# Patient Record
Sex: MALE | Race: WHITE | Employment: FULL TIME | URBAN - METROPOLITAN AREA
[De-identification: names, ages, dates, MRNs, and addresses within clinical notes are randomized per-mention and may not be internally consistent; named-entity substitution may affect disease eponyms.]

---

## 2017-02-28 ENCOUNTER — ALLSCRIPTS OFFICE VISIT (OUTPATIENT)
Dept: OTHER | Facility: OTHER | Age: 62
End: 2017-02-28

## 2017-02-28 ENCOUNTER — GENERIC CONVERSION - ENCOUNTER (OUTPATIENT)
Dept: OTHER | Facility: OTHER | Age: 62
End: 2017-02-28

## 2017-02-28 DIAGNOSIS — Z86.73 PERSONAL HISTORY OF TRANSIENT ISCHEMIC ATTACK (TIA), AND CEREBRAL INFARCTION WITHOUT RESIDUAL DEFICITS: ICD-10-CM

## 2017-02-28 DIAGNOSIS — E78.5 HYPERLIPIDEMIA: ICD-10-CM

## 2017-04-13 ENCOUNTER — APPOINTMENT (OUTPATIENT)
Dept: LAB | Age: 62
End: 2017-04-13
Payer: COMMERCIAL

## 2017-04-13 ENCOUNTER — TRANSCRIBE ORDERS (OUTPATIENT)
Dept: LAB | Age: 62
End: 2017-04-13

## 2017-04-13 DIAGNOSIS — Z86.73 PERSONAL HISTORY OF TRANSIENT ISCHEMIC ATTACK (TIA), AND CEREBRAL INFARCTION WITHOUT RESIDUAL DEFICITS: ICD-10-CM

## 2017-04-13 DIAGNOSIS — E78.5 HYPERLIPIDEMIA: ICD-10-CM

## 2017-04-13 LAB
ALBUMIN SERPL BCP-MCNC: 3.9 G/DL (ref 3.5–5)
ALP SERPL-CCNC: 105 U/L (ref 46–116)
ALT SERPL W P-5'-P-CCNC: 62 U/L (ref 12–78)
ANION GAP SERPL CALCULATED.3IONS-SCNC: 4 MMOL/L (ref 4–13)
AST SERPL W P-5'-P-CCNC: 33 U/L (ref 5–45)
BILIRUB SERPL-MCNC: 0.69 MG/DL (ref 0.2–1)
BUN SERPL-MCNC: 16 MG/DL (ref 5–25)
CALCIUM SERPL-MCNC: 9.2 MG/DL (ref 8.3–10.1)
CHLORIDE SERPL-SCNC: 103 MMOL/L (ref 100–108)
CHOLEST SERPL-MCNC: 130 MG/DL (ref 50–200)
CO2 SERPL-SCNC: 31 MMOL/L (ref 21–32)
CREAT SERPL-MCNC: 1 MG/DL (ref 0.6–1.3)
GFR SERPL CREATININE-BSD FRML MDRD: >60 ML/MIN/1.73SQ M
GLUCOSE P FAST SERPL-MCNC: 90 MG/DL (ref 65–99)
HDLC SERPL-MCNC: 66 MG/DL (ref 40–60)
LDLC SERPL CALC-MCNC: 58 MG/DL (ref 0–100)
POTASSIUM SERPL-SCNC: 4.4 MMOL/L (ref 3.5–5.3)
PROT SERPL-MCNC: 7.7 G/DL (ref 6.4–8.2)
SODIUM SERPL-SCNC: 138 MMOL/L (ref 136–145)
T4 FREE SERPL-MCNC: 0.79 NG/DL (ref 0.76–1.46)
TRIGL SERPL-MCNC: 32 MG/DL
TSH SERPL DL<=0.05 MIU/L-ACNC: 2.57 UIU/ML (ref 0.36–3.74)

## 2017-04-13 PROCEDURE — 80061 LIPID PANEL: CPT

## 2017-04-13 PROCEDURE — 84443 ASSAY THYROID STIM HORMONE: CPT

## 2017-04-13 PROCEDURE — 84439 ASSAY OF FREE THYROXINE: CPT

## 2017-04-13 PROCEDURE — 36415 COLL VENOUS BLD VENIPUNCTURE: CPT

## 2017-04-13 PROCEDURE — 80053 COMPREHEN METABOLIC PANEL: CPT

## 2017-04-25 ENCOUNTER — ALLSCRIPTS OFFICE VISIT (OUTPATIENT)
Dept: OTHER | Facility: OTHER | Age: 62
End: 2017-04-25

## 2017-07-18 ENCOUNTER — GENERIC CONVERSION - ENCOUNTER (OUTPATIENT)
Dept: OTHER | Facility: OTHER | Age: 62
End: 2017-07-18

## 2017-09-27 ENCOUNTER — ALLSCRIPTS OFFICE VISIT (OUTPATIENT)
Dept: OTHER | Facility: OTHER | Age: 62
End: 2017-09-27

## 2017-09-27 DIAGNOSIS — E78.5 HYPERLIPIDEMIA: ICD-10-CM

## 2017-09-27 DIAGNOSIS — Z00.00 ENCOUNTER FOR GENERAL ADULT MEDICAL EXAMINATION WITHOUT ABNORMAL FINDINGS: ICD-10-CM

## 2017-09-27 DIAGNOSIS — R53.83 OTHER FATIGUE: ICD-10-CM

## 2017-09-27 LAB
BILIRUB UR QL STRIP: NORMAL
CLARITY UR: NORMAL
COLOR UR: CLEAR
GLUCOSE (HISTORICAL): NORMAL
HGB UR QL STRIP.AUTO: NORMAL
KETONES UR STRIP-MCNC: NORMAL MG/DL
LEUKOCYTE ESTERASE UR QL STRIP: NORMAL
NITRITE UR QL STRIP: NORMAL
PH UR STRIP.AUTO: 5 [PH]
PROT UR STRIP-MCNC: NORMAL MG/DL
SP GR UR STRIP.AUTO: 1
UROBILINOGEN UR QL STRIP.AUTO: NORMAL

## 2017-10-10 ENCOUNTER — GENERIC CONVERSION - ENCOUNTER (OUTPATIENT)
Dept: OTHER | Facility: OTHER | Age: 62
End: 2017-10-10

## 2017-10-17 ENCOUNTER — GENERIC CONVERSION - ENCOUNTER (OUTPATIENT)
Dept: OTHER | Facility: OTHER | Age: 62
End: 2017-10-17

## 2017-11-01 ENCOUNTER — GENERIC CONVERSION - ENCOUNTER (OUTPATIENT)
Dept: OTHER | Facility: OTHER | Age: 62
End: 2017-11-01

## 2017-12-28 ENCOUNTER — TRANSCRIBE ORDERS (OUTPATIENT)
Dept: LAB | Age: 62
End: 2017-12-28

## 2017-12-28 ENCOUNTER — APPOINTMENT (OUTPATIENT)
Dept: LAB | Age: 62
End: 2017-12-28
Payer: COMMERCIAL

## 2017-12-28 DIAGNOSIS — E78.5 HYPERLIPIDEMIA: ICD-10-CM

## 2017-12-28 DIAGNOSIS — R53.83 OTHER FATIGUE: ICD-10-CM

## 2017-12-28 DIAGNOSIS — Z00.00 ENCOUNTER FOR GENERAL ADULT MEDICAL EXAMINATION WITHOUT ABNORMAL FINDINGS: ICD-10-CM

## 2017-12-28 LAB
ALBUMIN SERPL BCP-MCNC: 4.1 G/DL (ref 3.5–5)
ALP SERPL-CCNC: 71 U/L (ref 46–116)
ALT SERPL W P-5'-P-CCNC: 40 U/L (ref 12–78)
ANION GAP SERPL CALCULATED.3IONS-SCNC: 6 MMOL/L (ref 4–13)
AST SERPL W P-5'-P-CCNC: 25 U/L (ref 5–45)
BASOPHILS # BLD AUTO: 0.02 THOUSANDS/ΜL (ref 0–0.1)
BASOPHILS NFR BLD AUTO: 0 % (ref 0–1)
BILIRUB SERPL-MCNC: 0.87 MG/DL (ref 0.2–1)
BUN SERPL-MCNC: 14 MG/DL (ref 5–25)
CALCIUM SERPL-MCNC: 9 MG/DL (ref 8.3–10.1)
CHLORIDE SERPL-SCNC: 106 MMOL/L (ref 100–108)
CHOLEST SERPL-MCNC: 177 MG/DL (ref 50–200)
CO2 SERPL-SCNC: 29 MMOL/L (ref 21–32)
CREAT SERPL-MCNC: 0.98 MG/DL (ref 0.6–1.3)
EOSINOPHIL # BLD AUTO: 0.23 THOUSAND/ΜL (ref 0–0.61)
EOSINOPHIL NFR BLD AUTO: 4 % (ref 0–6)
ERYTHROCYTE [DISTWIDTH] IN BLOOD BY AUTOMATED COUNT: 13.7 % (ref 11.6–15.1)
GFR SERPL CREATININE-BSD FRML MDRD: 81 ML/MIN/1.73SQ M
GLUCOSE P FAST SERPL-MCNC: 93 MG/DL (ref 65–99)
HCT VFR BLD AUTO: 47 % (ref 36.5–49.3)
HDLC SERPL-MCNC: 75 MG/DL (ref 40–60)
HGB BLD-MCNC: 16.3 G/DL (ref 12–17)
LDLC SERPL CALC-MCNC: 90 MG/DL (ref 0–100)
LYMPHOCYTES # BLD AUTO: 2.38 THOUSANDS/ΜL (ref 0.6–4.47)
LYMPHOCYTES NFR BLD AUTO: 36 % (ref 14–44)
MCH RBC QN AUTO: 30.6 PG (ref 26.8–34.3)
MCHC RBC AUTO-ENTMCNC: 34.7 G/DL (ref 31.4–37.4)
MCV RBC AUTO: 88 FL (ref 82–98)
MONOCYTES # BLD AUTO: 0.48 THOUSAND/ΜL (ref 0.17–1.22)
MONOCYTES NFR BLD AUTO: 7 % (ref 4–12)
NEUTROPHILS # BLD AUTO: 3.41 THOUSANDS/ΜL (ref 1.85–7.62)
NEUTS SEG NFR BLD AUTO: 53 % (ref 43–75)
NRBC BLD AUTO-RTO: 0 /100 WBCS
PLATELET # BLD AUTO: 254 THOUSANDS/UL (ref 149–390)
PMV BLD AUTO: 10.5 FL (ref 8.9–12.7)
POTASSIUM SERPL-SCNC: 4.5 MMOL/L (ref 3.5–5.3)
PROT SERPL-MCNC: 8.1 G/DL (ref 6.4–8.2)
PSA SERPL-MCNC: 1.1 NG/ML (ref 0–4)
RBC # BLD AUTO: 5.32 MILLION/UL (ref 3.88–5.62)
SODIUM SERPL-SCNC: 141 MMOL/L (ref 136–145)
TRIGL SERPL-MCNC: 59 MG/DL
WBC # BLD AUTO: 6.53 THOUSAND/UL (ref 4.31–10.16)

## 2017-12-28 PROCEDURE — 85025 COMPLETE CBC W/AUTO DIFF WBC: CPT

## 2017-12-28 PROCEDURE — 80061 LIPID PANEL: CPT

## 2017-12-28 PROCEDURE — 80053 COMPREHEN METABOLIC PANEL: CPT

## 2017-12-28 PROCEDURE — G0103 PSA SCREENING: HCPCS

## 2017-12-28 PROCEDURE — 36415 COLL VENOUS BLD VENIPUNCTURE: CPT

## 2018-01-11 NOTE — PROGRESS NOTES
Assessment   1  Encounter for preventive health examination (V70 0) (Z00 00)  2  Dyslipidemia (272 4) (E78 5)  3  Vitamin D deficiency (268 9) (E55 9)    Plan  Dyslipidemia    · (1) T4, FREE; Status:Active; Requested for:97Cca0500;    · (1) TSH; Status:Active; Requested for:24Plc4254; Health Maintenance    · (1) CBC/PLT/DIFF; Status:Active; Requested for:22Swc8180;    · (1) COMPREHENSIVE METABOLIC PANEL; Status:Active; Requested for:81Vic2243;    · (1) LIPID PANEL, FASTING; Status:Active; Requested for:56Bnl2601;    · (1) PSA (SCREEN) (Dx V76 44 Screen for Prostate Cancer); Status:Active; Requested  for:41Sjk1263;    · DIGITAL RECTAL EXAM; Status:Resulted - Requires Verification;   Done: 19QRF2885  12:00AM1    · Hemoccult Screening - POC; Status:Resulted - Requires Verification;   Done: 23LDQ9913  12:00AM1    · Prostate Exam; Status:Resulted - Requires Verification;   Done: 84KOY9693 12:00AM1   Tick bite    · (1) LYME ANTIBODY, WESTERN BLOT; Status:Active; Requested for:02Qwz9190;   Vitamin D deficiency    · (1) VITAMIN D 25-HYDROXY; Status:Active; Requested QFR:16ZSE8758;      1 Amended By: Ariana Dumont; Sep 21 2016 2:55 PM EST    Discussion/Summary  Impression: health maintenance visit  Prostate cancer screening: the risks and benefits of prostate cancer screening were discussed and prostate cancer screening is current  Testicular cancer screening: the risks and benefits of testicular cancer screening were discussed and testicular cancer screening is current  Colorectal cancer screening: the risks and benefits of colorectal cancer screening were discussed and colorectal cancer screening is current  The risks and benefits of immunizations were discussed and immunizations are up to date  Patient discussion:  discussed with the patient1  1        Received flu vaccine1        1 Amended By: Ariana Dumont; Sep 21 2016 2:55 PM EST    Chief Complaint  insurance well exam      History of Present Illness  HM, Adult Male: The patient is being seen for a health maintenance evaluation  Social History: Household members include spouse  He is   Work status: working full time  The patient has never smoked cigarettes  He reports frequent alcohol use  He has never used illicit drugs  General Health: The patient's health since the last visit is described as good  He has regular dental visits  He denies vision problems  He denies hearing loss  Lifestyle:  He consumes a diverse and healthy diet  He does not have any weight concerns  He exercises regularly  He does not use tobacco  He consumes alcohol  He denies drug use  Screening: Prostate cancer screening includes prostate-specific antigen testing last year  Testicular cancer screening includes no previous evaluation  Colorectal cancer screening includes a colonoscopy within the past ten years  Metabolic screening includes lipid profile performed within the past five years and glucose screening performed last year  Cardiovascular risk factors: no hypertension, no diabetes, no tobacco use, no illicit drug use and no sedentary lifestyle  Safety elements used: safe driving habits, smoke detector and carbon monoxide detector  Review of Systems    Constitutional: No fever or chills, feels well, no tiredness, no recent weight gain or weight loss  ENT: no complaints of earache, no hearing loss, no nosebleeds, no nasal discharge, no sore throat, no hoarseness  Cardiovascular: No complaints of slow heart rate, no fast heart rate, no chest pain, no palpitations, no leg claudication, no lower extremity  Respiratory: No complaints of shortness of breath, no wheezing, no cough, no SOB on exertion, no orthopnea or PND  Active Problems   1  Dyslipidemia (272 4) (E78 5)  2  Fatigue (780 79) (R53 83)  3   Lumbar pain (724 2) (M54 5)    Past Medical History    · History of Abdominal pain, lower (789 09) (R10 30)   · History of Acute bronchitis (466 0) (J20 9)   · History of Acute sinusitis (461 9) (J01 90)   · History of Bee Sting (E905 3)    Surgical History    · Denied: History Of Prior Surgery    Family History  Mother    · Family history of Mother  At Age 62  Father    · Family history of Father  At Age 80    Social History    · Alcohol Use (History)   · Alcohol Use (History)   · DAILY USE   · Never A Smoker    Current Meds  1  Fish Oil 1000 MG Oral Capsule; TAKE 1 CAPSULE DAILY; Therapy: 99KOL1902 to Recorded  2  Glucosamine Chondr 1500 Complx Oral Capsule; TAKE 1 CAPSULE DAILY; Therapy: 03IEP6520 to Recorded  3  Multivitamins Oral Capsule; TAKE 1 CAPSULE DAILY; Therapy: 40BGH2303 to (Evaluate:36Drr2514) Recorded  4  Red Yeast Rice Extract Powder; USE AS DIRECTED; Therapy: 99JRY4694 to Recorded  5  Vitamin D3 Super Strength 2000 UNIT Oral Tablet; TAKE 1 TABLET BY MOUTH DAILY   AT 8AM *BEG  (SUPPLEMENT); Therapy: 09ASI9563 to Recorded    Allergies   1  No Known Drug Allergies   2  Bee sting    Vitals   Recorded: 26OBG3751 68:12UD   Systolic 005   Diastolic 78   Heart Rate 68   Temperature 97 5 F, Tympanic   Height 5 ft 8 in   Weight 162 lb    BMI Calculated 24 63   BSA Calculated 1 87     Physical Exam    Constitutional   General appearance: No acute distress, well appearing and well nourished  Head and Face   Head and face: Normal     Palpation of the face and sinuses: No sinus tenderness  Eyes   Conjunctiva and lids: No erythema, swelling or discharge  Pupils and irises: Equal, round, reactive to light  Ears, Nose, Mouth, and Throat   External inspection of ears and nose: Normal     Otoscopic examination: Tympanic membranes translucent with normal light reflex  Canals patent without erythema  Hearing: Normal     Nasal mucosa, septum, and turbinates: Normal without edema or erythema  Lips, teeth, and gums: Normal, good dentition  Neck   Neck: Supple, symmetric, trachea midline, no masses      Thyroid: Normal, no thyromegaly  Pulmonary   Respiratory effort: No increased work of breathing or signs of respiratory distress  Cardiovascular   Auscultation of heart: Normal rate and rhythm, normal S1 and S2, no murmurs  Carotid pulses: 2+ bilaterally  Abdomen   Abdomen: Non-tender, no masses  -heme, -nodule  Liver and spleen: No hepatomegaly or splenomegaly  Examination for hernias: No hernias appreciated   -inguinal hernia        Procedure    Procedure:   Results: 20/20 in both eyes without corrective device, 20/20 in the right eye without corrective device, 20/25 in the left eye without corrective device      Signatures   Electronically signed by : Chelsy Calzada DO; Sep 21 2016  2:58PM EST                       (Author)

## 2018-01-12 NOTE — MISCELLANEOUS
Message  discussed to start him on lipitor   he will consider      Signatures   Electronically signed by : Hilario Hernandez DO; Oct 10 2016  8:03AM EST                       (Author)

## 2018-01-13 VITALS
HEART RATE: 61 BPM | DIASTOLIC BLOOD PRESSURE: 76 MMHG | SYSTOLIC BLOOD PRESSURE: 128 MMHG | RESPIRATION RATE: 16 BRPM | BODY MASS INDEX: 25.22 KG/M2 | WEIGHT: 166.38 LBS | OXYGEN SATURATION: 97 % | HEIGHT: 68 IN | TEMPERATURE: 97.6 F

## 2018-01-16 NOTE — MISCELLANEOUS
Assessment    1  History of TIA (transient ischemic attack) and stroke (V12 54) (Z86 73)   2  Dyslipidemia (272 4) (E78 5)    Plan  Dyslipidemia    · Atorvastatin Calcium 80 MG Oral Tablet (Lipitor); TAKE 1 TABLET DAILY   Rx By: Piper Lewis; Dispense: 30 Days ; #:30 Tablet; Refill: 5; For: Dyslipidemia; MARY JANE = N; Verified Transmission to [address] [place] [address] [id number]; Last Updated By: SystemSPO; 2/28/2017 3:26:40 PM   · (1) TSH; Status:Active; Requested for:28Feb2017;    Perform:[facility]; EQH:55VND8284; Ordered; For:Dyslipidemia; Ordered By:Roman Funez;  Dyslipidemia, History of TIA (transient ischemic attack) and stroke    · (1) COMPREHENSIVE METABOLIC PANEL; Status:Active; Requested for:28Feb2017;    Perform:[facility]; CCF:55XUN6169; Ordered; For:Dyslipidemia, History of TIA (transient ischemic attack) and stroke; Ordered By:Obdulia Funez;   · (1) LIPID PANEL, FASTING; Status:Active; Requested for:28Feb2017;    Perform:[facility]; ETS:38DZI5182; Ordered; For:Dyslipidemia, History of TIA (transient ischemic attack) and stroke; Ordered By:Roman Funez;  History of TIA (transient ischemic attack) and stroke    · (1) T4, FREE; Status:Active; Requested for:28Feb2017;    Perform:[facility]; XDK:63JJF4399; Ordered;  For:History of TIA (transient ischemic attack) and stroke; Ordered By:Obdulia Funez;    Discussion/Summary  Discussion Summary:   Told him check bp q 3 days and write down  his bp was elevated upon arival in hosp  ? situation ? otold him check lipids/lft in 6 weeks after starting statin  Chief Complaint  Chief Complaint Free Text Note Form: patient presented here for follow up from hospital   Chief [address]: Patient is here today for follow up of chronic conditions described in HPI        History of Present Illness  TCM Communication [facility]: The patient is being contacted for follow-up after hospitalization  Hospital records were not available  He was hospitalized [facility]  The date of admission: 02/19/2017, date of discharge: 02/20/2017  Diagnosis: TIA  He was discharged to home  Medications reviewed and updated today  He scheduled a follow up appointment  Follow-up appointments with other specialists: NONE  The patient is currently asymptomatic  Counseling was provided to the patient  Topics counseled included importance of compliance with treatment  Communication performed and completed by Jennie Leyden   HPI: pt states he had numbness/tingling left side of his body  denies durling or weakness  he went to hosp in ambulance  he was admitted and had neuro consult and told okay  had mri/mra  the studies were okay  we received them and they are in the chart  his tsh is up a little ? if of concern      Review of Systems  Complete-Male:   Constitutional: No fever or chills, feels well, no tiredness, no recent weight gain or weight loss  ENT: no complaints of earache, no hearing loss, no nosebleeds, no nasal discharge, no sore throat, no hoarseness  Cardiovascular: No complaints of slow heart rate, no fast heart rate, no chest pain, no palpitations, no leg claudication, no lower extremity  Respiratory: No complaints of shortness of breath, no wheezing, no cough, no SOB on exertion, no orthopnea or PND  Gastrointestinal: No complaints of abdominal pain, no constipation, no nausea or vomiting, no diarrhea or bloody stools  Genitourinary: No complaints of dysuria, no incontinence, no hesitancy, no nocturia, no genital lesion, no testicular pain  Integumentary: No complaints of skin rash or skin lesions, no itching, no skin wound, no dry skin  Active Problems    1  Dyslipidemia (272 4) (E78 5)   2  Vitamin D deficiency (268 9) (E55 9)    Past Medical History    1  History of Abdominal pain, lower (789 09) (R10 30)   2  History of Acute bronchitis (466 0) (J20 9)   3   History of Acute sinusitis (461 9) (J01 90)   4  History of Bee Sting (C927 7)    Surgical History    1  Denied: History Of Prior Surgery  Surgical History Reviewed: The surgical history was reviewed and updated today  Family History  Mother    1  Family history of Mother  At Age 62  Father    2  Family history of Father  At Age 80  Family History Reviewed: The family history was reviewed and updated today  Social History    · Alcohol Use (History)   · Alcohol Use (History)   · Never A Smoker  Social History Reviewed: The social history was reviewed and updated today  The social history was reviewed and is unchanged  Current Meds   1  Aspirin EC 81 MG Oral Tablet Delayed Release; TAKE 1 TABLET DAILY; Therapy: 92Bco1809 to Recorded   2  Fish Oil 1000 MG Oral Capsule; TAKE 1 CAPSULE DAILY; Therapy: 14CQO3306 to Recorded   3  Glucosamine Chondr 1500 Complx Oral Capsule; TAKE 1 CAPSULE DAILY; Therapy: 09PXM1017 to Recorded   4  Lipitor 80 MG Oral Tablet; TAKE 1 TABLET DAILY; Therapy: 73Uel9535 to Recorded   5  Multivitamins Oral Capsule; TAKE 1 CAPSULE DAILY; Therapy: 38XZI9328 to (Evaluate:2015) Recorded   6  Red Yeast Rice Extract Powder; USE AS DIRECTED; Therapy: 46ANL4831 to Recorded   7  Vitamin D3 Super Strength 2000 UNIT Oral Tablet; TAKE 1 TABLET BY MOUTH DAILY AT   8AM *BEG  (SUPPLEMENT); Therapy: 83GNQ0489 to Recorded  Medication List Reviewed: The medication list was reviewed and updated today  Allergies    1  No Known Drug Allergies    2   Bee sting    Vitals  Signs   Recorded: 2017 02:42PM   Temperature: 97 5 F, Tympanic  Heart Rate: 68  Pulse Quality: Normal  Respiration Quality: Normal  Respiration: 16  Systolic: 228, LUE, Sitting  Diastolic: 84, LUE, Sitting  Height: 5 ft 8 in  Weight: 163 lb 8 oz  BMI Calculated: 24 86  BSA Calculated: 1 88  O2 Saturation: 98    Physical Exam    Constitutional   General appearance: No acute distress, well appearing and well nourished  Eyes   Conjunctiva and lids: No swelling, erythema, or discharge  Pupils and irises: Equal, round and reactive to light  Ears, Nose, Mouth, and Throat   External inspection of ears and nose: Normal     Otoscopic examination: Tympanic membrance translucent with normal light reflex  Canals patent without erythema  Nasal mucosa, septum, and turbinates: Normal without edema or erythema  Oropharynx: Normal with no erythema, edema, exudate or lesions  Pulmonary   Respiratory effort: No increased work of breathing or signs of respiratory distress  Auscultation of lungs: Clear to auscultation, equal breath sounds bilaterally, no wheezes, no rales, no rhonci  Cardiovascular   Auscultation of heart: Normal rate and rhythm, normal S1 and S2, without murmurs  Examination of extremities for edema and/or varicosities: Normal     Carotid pulses: Normal     Abdomen   Abdomen: Non-tender, no masses  Liver and spleen: No hepatomegaly or splenomegaly  Lymphatic   Palpation of lymph nodes in neck: No lymphadenopathy  Results/Data  PHQ-2 Adult Depression Screening 84Yxt7455 02:43PM User, Sanchez     Test Name Result Flag Reference   PHQ-2 Adult Depression Score 0     Over the last two weeks, how often have you been bothered by any of the following problems?   Little interest or pleasure in doing things: Not at all - 0  Feeling down, depressed, or hopeless: Not at all - 0   PHQ-2 Adult Depression Screening Negative       (1) PSA (SCREEN) (Dx V76 44 Screen for Prostate Cancer) 53DYX7703 12:00AM      Test Name Result Flag Reference   PSA 1 09         Future Appointments    Date/Time Provider Specialty Site   04/27/2017 02:30 PM Rachael Agudelo DO Internal Medicine [address] [place] [address]   Electronically signed by : Tamara Carver DO; Feb 28 2017  9:13PM EST                       (Author)

## 2018-01-22 VITALS
SYSTOLIC BLOOD PRESSURE: 134 MMHG | DIASTOLIC BLOOD PRESSURE: 84 MMHG | TEMPERATURE: 97.5 F | OXYGEN SATURATION: 98 % | BODY MASS INDEX: 24.78 KG/M2 | WEIGHT: 163.5 LBS | RESPIRATION RATE: 16 BRPM | HEART RATE: 68 BPM | HEIGHT: 68 IN

## 2018-01-22 VITALS
WEIGHT: 163 LBS | HEART RATE: 66 BPM | DIASTOLIC BLOOD PRESSURE: 72 MMHG | TEMPERATURE: 96.7 F | RESPIRATION RATE: 16 BRPM | SYSTOLIC BLOOD PRESSURE: 120 MMHG | OXYGEN SATURATION: 97 % | BODY MASS INDEX: 24.71 KG/M2 | HEIGHT: 68 IN

## 2018-01-22 VITALS
HEIGHT: 68 IN | HEART RATE: 70 BPM | WEIGHT: 167.25 LBS | TEMPERATURE: 96.9 F | BODY MASS INDEX: 25.35 KG/M2 | SYSTOLIC BLOOD PRESSURE: 120 MMHG | RESPIRATION RATE: 16 BRPM | DIASTOLIC BLOOD PRESSURE: 78 MMHG | OXYGEN SATURATION: 98 %

## 2018-01-23 NOTE — PROGRESS NOTES
Assessment   1  Encounter for preventive health examination (V70 0) (Z00 00)  2  TIA (transient ischemic attack) (435 9) (G45 9)  3  Dyslipidemia (272 4) (E78 5)  4  1   5  Skin lesion of face (709 9) (L98 9)1      1 Amended By: Arnav Myers; Sep 28 2017 5:06 AM EST    Plan   Dyslipidemia    ·   Renew: Atorvastatin Calcium 40 MG Oral Tablet; TAKE 1 TABLET DAILY AS DIRECTED  1    · 3   3    · (1) COMPREHENSIVE METABOLIC PANEL; Status:Active; Requested QWM:[id number];    · (1) LIPID PANEL, FASTING; Status:Active; Requested DJM:[id number];   Fatigue    · (1) CBC/PLT/DIFF; Status:Active; Requested [id number]; Health Maintenance    · (1) PSA (SCREEN) (Dx V76 44 Screen for Prostate Cancer); Status:Active; Requested  WHN:[id number];    · DIGITAL RECTAL EXAM; Status:Resulted - Requires Verification;   Done: 35OEM9741  12:00AM4    · Hemoccult Diagnostic - POC; Status:Resulted - Requires Verification;   Done:  99JFE5060 12:00AM4    · Prostate Exam; Status:Resulted - Requires Verification;   Done: 26QUW2850 12:00AM4    · SCREEN AUDIOGRAM- POC; Status:Complete - Retrospective By Protocol  Authorization;   Done: 80LKM7315 01:39PM   · SNELLEN VISION- POC; Status:Complete - Retrospective By Protocol Authorization;    Done: 96XTP9257 01:41PM   · Urine Dip Non-Automated- POC; Status:Complete - Retrospective By Protocol  Authorization;   Done: 04WMN0148 01:30PM  Skin lesion of face    · 1,3   1,3    · 2,3   2,3     [facility]  *  Status: Hold For - Scheduling  Requested for: 75SKU8878  Ordered; For: Dyslipidemia; Ordered By: Arnav Myers  Performed:   Due: 37ALN3791  [facility]  *  Status: Hold For - Scheduling  Requested for: 45EXX2235  Ordered;    For: Skin lesion of face;  Ordered By: Arnav Myers  Performed:   Due: 19SNQ6303  [facility]  [facility]  *  Status: Hold For - Scheduling  Requested for: 78LOY7332  Ordered; For: Skin lesion of face;  Ordered By: José Luis Porter  Performed:   Due: 71OSW0207      1 Amended By: José Luis Porter; Sep 27 2017 2:25 PM EST   2 Amended By: José Luis Porter; Sep 27 2017 2:29 PM EST   3 Amended By: José Luis Porter; Sep 28 2017 4:56 AM EST   4 Amended By: José Luis Porter; Sep 28 2017 9:36 AM EST    Discussion/Summary  health maintenance visit Prostate cancer screening: the risks and benefits of prostate cancer screening were discussed  Testicular cancer screening: the risks and benefits of testicular cancer screening were discussed  Colorectal cancer screening: the risks and benefits of colorectal cancer screening were discussed  Patient discussion: discussed with the patient  The patient was counseled on Hepatitis C screening  Chief Complaint  patient presents for a physical       History of Present Illness  HM, Adult Male: The patient is being seen for a health maintenance evaluation  Social History: Household members include spouse  He is   Work status: working full time  The patient has never smoked cigarettes  He reports frequent alcohol use  He has never used illicit drugs  General Health: The patient's health since the last visit is described as good  He has regular dental visits  He denies vision problems  He denies hearing loss  Lifestyle:  He consumes a diverse and healthy diet  He does not have any weight concerns  He exercises regularly  He does not use tobacco  He consumes alcohol  He denies drug use  Screening: Prostate cancer screening includes prostate-specific antigen testing last year  Testicular cancer screening includes monthly self testicular examinations  Colorectal cancer screening includes a colonoscopy within the past ten years  Metabolic screening includes lipid profile performed within the past five years, glucose screening performed last year and thyroid function test performed last year       Cardiovascular risk factors: no obesity, no tobacco use, no illicit drug use and no sedentary lifestyle  General health risks: no asbestos exposure and no radiation exposure  Safety elements used: safe driving habits and smoke detector  HPI: denies further tia s/s1        1 Amended By: Philipp Maier; Sep 28 2017 4:59 AM EST    Review of Systems    Constitutional: No fever or chills, feels well, no tiredness, no recent weight gain or weight loss  Eyes: No complaints of eye pain, no red eyes, no discharge from eyes, no itchy eyes  ENT: no complaints of earache, no hearing loss, no nosebleeds, no nasal discharge, no sore throat, no hoarseness  Cardiovascular: No complaints of slow heart rate, no fast heart rate, no chest pain, no palpitations, no leg claudication, no lower extremity  Respiratory: No complaints of shortness of breath, no wheezing, no cough, no SOB on exertion, no orthopnea or PND  Active Problems   1  Dyslipidemia (272 4) (E78 5)  2  Screening PSA (prostate specific antigen) (V76 44) (Z12 5)  3   Vitamin D deficiency (268 9) (E55 9)    Past Medical History    · History of Abdominal pain, lower (789 09) (R10 30)   · History of Acute bronchitis (466 0) (J20 9)   · History of Acute sinusitis (461 9) (J01 90)   · History of Bee sting (989 5,E905 3) (T63 441A)   · History of Encounter for screening for malignant neoplasm of colon (V76 51) (Z12 11)   · Denied: History of depression   · History of fatigue (V13 89) (Z87 898)   · History of low back pain (V13 59) (Z87 39)   · Denied: History of substance abuse   · History of TIA (transient ischemic attack) and stroke (V12 54) (Z86 73)   · History of Tick bite (919 4,E906 4) (W57 XXXA)    Surgical History    · Denied: History Of Prior Surgery    Family History  Mother    · Family history of Mother  At Age 62  Father    · Family history of Father  At Age 80  Family History    · Denied: Family history of depression   · Denied: Family history of substance abuse    Social History    · Never A Smoker    Current Meds  1  Aspirin EC 81 MG Oral Tablet Delayed Release; TAKE 1 TABLET DAILY; Therapy: 21Feb2017 to Recorded  2  Atorvastatin Calcium 80 MG Oral Tablet; TAKE 1 TABLET DAILY; Therapy: 21Feb2017 to (Evaluate:31Uxx8544)  Requested for: 75BFF2631; Last   Rx:[id number] Ordered    Allergies   1  No Known Drug Allergies   2  Bee sting    Vitals   Recorded: 37WYX9909 01:22PM   Temperature 96 9 F, Tympanic   Heart Rate 70, R Brachial Artery   Pulse Quality Normal, R Brachial Artery   Respiration Quality Normal   Respiration 16   Systolic 601, LUE, Sitting   Diastolic 78, LUE, Sitting   Height 5 ft 8 in   Weight 167 lb 4 oz   BMI Calculated 25 43   BSA Calculated 1 89   O2 Saturation 98     Physical Exam    Constitutional   General appearance: No acute distress, well appearing and well nourished  Head and Face   Head and face: Normal     Palpation of the face and sinuses: No sinus tenderness  Eyes   Conjunctiva and lids: No erythema, swelling or discharge  Pupils and irises: Equal, round, reactive to light  Ears, Nose, Mouth, and Throat   External inspection of ears and nose: Normal     Otoscopic examination: Tympanic membranes translucent with normal light reflex  Canals patent without erythema  Hearing: Normal     Nasal mucosa, septum, and turbinates: Normal without edema or erythema  Lips, teeth, and gums: Normal, good dentition  Oropharynx: Normal with no erythema, edema, exudate or lesions  Neck   Neck: Supple, symmetric, trachea midline, no masses  Thyroid: Normal, no thyromegaly  Pulmonary   Respiratory effort: No increased work of breathing or signs of respiratory distress  Auscultation of lungs: Clear to auscultation  1    Cardiovascular1    Auscultation of heart: Normal rate and rhythm, normal S1 and S2, no murmurs  1    Abdomen   Anus, perineum, and rectum: Normal sphincter tone, no masses, no prolapse   - NODULE -HEME  Stool sample for occult blood: Negative  1 Amended By: Kyle Valenzuela; Sep 28 2017 5:06 AM EST    Results/Data  SNELLEN VISION- POC [id number] 01:41PM Kyle Valenzuela     Test Name Result Flag Reference   Right Eye 40     Left Eye 40     Bilateral Eyes 25       SCREEN AUDIOGRAM- POC 29KCH5133 01:39PM Kyle Valenzuela     Test Name Result Flag Reference   Screening Audiogram      Right=Normal; Left=500-25, 1000-0, 2000-20, 4000-40     Urine Dip Non-Automated- POC 80TRV6220 01:30PM Kyle Valenzuela     Test Name Result Flag Reference   Color Clear     Clarity Transparent     Leukocytes NEG     Nitrite NEG     Blood NEG     Bilirubin NEG     Urobilinogen NEG     Protein NEG     Ph 5     Specific Magazine 1 005     Ketone NEG     Glucose NEG       PHQ-9 Adult Depression Screening 84HUR4604 01:27PM User, Sanchez     Test Name Result Flag Reference   PHQ-9 Adult Depression Score 0     Over the last two weeks, how often have you been bothered by any of the following problems? Little interest or pleasure in doing things: Not at all - 0  Feeling down, depressed, or hopeless: Not at all - 0  Trouble falling or staying asleep, or sleeping too much: Not at all - 0  Feeling tired or having little energy: Not at all - 0  Poor appetite or over eating: Not at all - 0  Feeling bad about yourself - or that you are a failure or have let yourself or your family down: Not at all - 0  Trouble concentrating on things, such as reading the newspaper or watching television: Not at all - 0  Moving or speaking so slowly that other people could have noticed  Or the opposite -  being so fidgety or restless that you have been moving around a lot more than usual: Not at all - 0  Thoughts that you would be better off dead, or of hurting yourself in some way: Not at all - 0   PHQ-9 Adult Depression Screening Negative     PHQ-9 Difficulty Level Not difficult at all     PHQ-9 Severity No Depression         Procedure    Procedure: Visual Acuity Test    Indication: routine screening     Results: 20/25 in both eyes without corrective device, 20/40 in the right eye without corrective device, 20/40 in the left eye without corrective device   Color vision was screened with the Ishihara Test and the results were normal      Procedure: Audiometry:   Hearing in the right ear: 40 decibals at 500 hertz, 20 decibals at 1000 hertz, 20 decibals at 2000 hertz and 25 decibals at 4000 hertz  Hearing in the left ear: 25 decibals at 500 hertz, 0 decibals at 1000 hertz, 20 decibals at 2000 hertz and 40 decibals at 4000 hertz         Signatures   Electronically signed by : Dyllan Trevizo DO; Sep 28 2017  9:37AM EST                       (Author)

## 2018-02-21 ENCOUNTER — LAB (OUTPATIENT)
Dept: LAB | Age: 63
End: 2018-02-21
Payer: COMMERCIAL

## 2018-02-21 DIAGNOSIS — E78.5 HYPERLIPIDEMIA: ICD-10-CM

## 2018-02-21 LAB
ALBUMIN SERPL BCP-MCNC: 3.9 G/DL (ref 3.5–5)
ALP SERPL-CCNC: 71 U/L (ref 46–116)
ALT SERPL W P-5'-P-CCNC: 35 U/L (ref 12–78)
ANION GAP SERPL CALCULATED.3IONS-SCNC: 9 MMOL/L (ref 4–13)
AST SERPL W P-5'-P-CCNC: 18 U/L (ref 5–45)
BILIRUB SERPL-MCNC: 0.69 MG/DL (ref 0.2–1)
BUN SERPL-MCNC: 14 MG/DL (ref 5–25)
CALCIUM SERPL-MCNC: 9 MG/DL (ref 8.3–10.1)
CHLORIDE SERPL-SCNC: 106 MMOL/L (ref 100–108)
CHOLEST SERPL-MCNC: 147 MG/DL (ref 50–200)
CO2 SERPL-SCNC: 26 MMOL/L (ref 21–32)
CREAT SERPL-MCNC: 0.94 MG/DL (ref 0.6–1.3)
GFR SERPL CREATININE-BSD FRML MDRD: 85 ML/MIN/1.73SQ M
GLUCOSE P FAST SERPL-MCNC: 92 MG/DL (ref 65–99)
HDLC SERPL-MCNC: 68 MG/DL (ref 40–60)
LDLC SERPL CALC-MCNC: 67 MG/DL (ref 0–100)
POTASSIUM SERPL-SCNC: 4.5 MMOL/L (ref 3.5–5.3)
PROT SERPL-MCNC: 7.8 G/DL (ref 6.4–8.2)
SODIUM SERPL-SCNC: 141 MMOL/L (ref 136–145)
TRIGL SERPL-MCNC: 60 MG/DL

## 2018-02-21 PROCEDURE — 80053 COMPREHEN METABOLIC PANEL: CPT

## 2018-02-21 PROCEDURE — 80061 LIPID PANEL: CPT

## 2018-02-21 PROCEDURE — 36415 COLL VENOUS BLD VENIPUNCTURE: CPT

## 2018-04-30 ENCOUNTER — TELEPHONE (OUTPATIENT)
Dept: FAMILY MEDICINE CLINIC | Facility: CLINIC | Age: 63
End: 2018-04-30

## 2018-04-30 NOTE — TELEPHONE ENCOUNTER
PATIENT CALLED HE WILL NEED A PRIOR AUTH FOR ATORVASTATIN 40 MG BECAUSE HE TAKES 1 1/2 TABLETS DAILY  CALL [phone]

## 2018-05-02 NOTE — TELEPHONE ENCOUNTER
Spk with patient  New insurance  Called to preauth Atorvastatin 1 1/2 tabs per day  They will fax a result after review

## 2018-05-08 NOTE — TELEPHONE ENCOUNTER
Spoke with insurance and they approved Atorvastatin 40mg 1 1/2 every day  [facility] with this information as well

## 2018-07-17 ENCOUNTER — TELEPHONE (OUTPATIENT)
Dept: FAMILY MEDICINE CLINIC | Facility: CLINIC | Age: 63
End: 2018-07-17

## 2018-07-17 DIAGNOSIS — E78.5 DYSLIPIDEMIA: Primary | ICD-10-CM

## 2018-07-17 RX ORDER — ATORVASTATIN CALCIUM 40 MG/1
TABLET, FILM COATED ORAL
Qty: 135 TABLET | Refills: 0 | Status: SHIPPED | OUTPATIENT
Start: 2018-07-17 | End: 2018-11-11 | Stop reason: SDUPTHER

## 2018-07-17 RX ORDER — ATORVASTATIN CALCIUM 40 MG/1
TABLET, FILM COATED ORAL
Qty: 135 TABLET | Refills: 1 | Status: SHIPPED | OUTPATIENT
Start: 2018-07-17 | End: 2018-07-17 | Stop reason: SDUPTHER

## 2018-07-17 RX ORDER — ATORVASTATIN CALCIUM 40 MG/1
TABLET, FILM COATED ORAL
COMMUNITY
Start: 2017-02-21 | End: 2018-11-14

## 2018-11-11 DIAGNOSIS — E78.5 DYSLIPIDEMIA: ICD-10-CM

## 2018-11-11 RX ORDER — ATORVASTATIN CALCIUM 40 MG/1
60 TABLET, FILM COATED ORAL DAILY
Qty: 135 TABLET | Refills: 1 | Status: SHIPPED | OUTPATIENT
Start: 2018-11-11 | End: 2019-02-26 | Stop reason: SDUPTHER

## 2018-11-13 RX ORDER — ALBUTEROL SULFATE 90 UG/1
2 AEROSOL, METERED RESPIRATORY (INHALATION) EVERY 4 HOURS PRN
COMMUNITY
Start: 2017-10-10 | End: 2018-11-14

## 2018-11-14 ENCOUNTER — OFFICE VISIT (OUTPATIENT)
Dept: FAMILY MEDICINE CLINIC | Facility: CLINIC | Age: 63
End: 2018-11-14
Payer: COMMERCIAL

## 2018-11-14 VITALS
RESPIRATION RATE: 16 BRPM | OXYGEN SATURATION: 98 % | SYSTOLIC BLOOD PRESSURE: 116 MMHG | TEMPERATURE: 97 F | DIASTOLIC BLOOD PRESSURE: 70 MMHG | WEIGHT: 164.4 LBS | BODY MASS INDEX: 24.92 KG/M2 | HEART RATE: 66 BPM | HEIGHT: 68 IN

## 2018-11-14 DIAGNOSIS — E78.5 DYSLIPIDEMIA: ICD-10-CM

## 2018-11-14 DIAGNOSIS — G45.9 TIA (TRANSIENT ISCHEMIC ATTACK): ICD-10-CM

## 2018-11-14 DIAGNOSIS — M19.90 OSTEOARTHRITIS, UNSPECIFIED OSTEOARTHRITIS TYPE, UNSPECIFIED SITE: ICD-10-CM

## 2018-11-14 DIAGNOSIS — R53.83 FATIGUE, UNSPECIFIED TYPE: ICD-10-CM

## 2018-11-14 DIAGNOSIS — Z00.00 PHYSICAL EXAM: Primary | ICD-10-CM

## 2018-11-14 DIAGNOSIS — Z12.5 SCREENING FOR PROSTATE CANCER: ICD-10-CM

## 2018-11-14 PROCEDURE — 99173 VISUAL ACUITY SCREEN: CPT | Performed by: INTERNAL MEDICINE

## 2018-11-14 PROCEDURE — 92551 PURE TONE HEARING TEST AIR: CPT | Performed by: INTERNAL MEDICINE

## 2018-11-14 PROCEDURE — 99397 PER PM REEVAL EST PAT 65+ YR: CPT | Performed by: INTERNAL MEDICINE

## 2018-11-14 RX ORDER — OMEGA-3 FATTY ACIDS CAP DELAYED RELEASE 1000 MG 1000 MG
CAPSULE DELAYED RELEASE ORAL 2 TIMES DAILY
Refills: 0
Start: 2018-11-14

## 2018-11-14 RX ORDER — LANOLIN ALCOHOL/MO/W.PET/CERES
1 CREAM (GRAM) TOPICAL 3 TIMES DAILY
Refills: 0
Start: 2018-11-14

## 2018-11-14 NOTE — PROGRESS NOTES
Assessment/Plan:         Diagnoses and all orders for this visit:    Physical exam    TIA (transient ischemic attack)  -     Omega-3 Fatty Acids (FISH OIL) 1000 MG CPDR; Take by mouth 2 (two) times a day    Osteoarthritis, unspecified osteoarthritis type, unspecified site  -     glucosamine-chondroitin 500-400 MG tablet; Take 1 tablet by mouth 3 (three) times a day    Dyslipidemia  -     Comprehensive metabolic panel; Future  -     Lipid panel; Future    Screening for prostate cancer  -     PSA, total and free; Future    Fatigue, unspecified type  -     CBC; Future    Other orders  -     ASPIRIN 81 PO; Take 1 tablet by mouth daily  -     Discontinue: albuterol (PROAIR HFA) 90 mcg/act inhaler; Inhale 2 puffs every 4 (four) hours as needed          Subjective:      Patient ID: Vega Mixon is a 70 y o  male  Pt in for annual well        The following portions of the patient's history were reviewed and updated as appropriate:   He  has a past medical history of H/O TIA (transient ischemic attack) and stroke and Skin lesion of face  He There are no active problems to display for this patient  He  has a past surgical history that includes No past surgeries  His family history is not on file  He  reports that he has never smoked  He does not have any smokeless tobacco history on file  He reports that he drinks alcohol  His drug history is not on file  Current Outpatient Prescriptions   Medication Sig Dispense Refill    ASPIRIN 81 PO Take 1 tablet by mouth daily      atorvastatin (LIPITOR) 40 mg tablet Take 1 5 tablets (60 mg total) by mouth daily 135 tablet 1    glucosamine-chondroitin 500-400 MG tablet Take 1 tablet by mouth 3 (three) times a day  0    Omega-3 Fatty Acids (FISH OIL) 1000 MG CPDR Take by mouth 2 (two) times a day  0     No current facility-administered medications for this visit        Current Outpatient Prescriptions on File Prior to Visit   Medication Sig    atorvastatin (LIPITOR) 40 mg tablet Take 1 5 tablets (60 mg total) by mouth daily     No current facility-administered medications on file prior to visit  He is allergic to bee venom       Review of Systems   Constitutional: Negative  HENT: Negative  Respiratory: Negative  Cardiovascular: Negative  Neurological: Negative for syncope and facial asymmetry  Objective:      /70 (BP Location: Right arm, Patient Position: Sitting, Cuff Size: Standard)   Pulse 66   Temp (!) 97 °F (36 1 °C)   Resp 16   Ht 5' 8" (1 727 m)   Wt 74 6 kg (164 lb 6 4 oz)   SpO2 98%   BMI 25 00 kg/m²          Physical Exam   Constitutional: He appears well-developed and well-nourished  HENT:   Head: Normocephalic and atraumatic  Right Ear: External ear normal    Left Ear: External ear normal    Neck: Normal range of motion  Neck supple  Cardiovascular: Normal rate, regular rhythm and normal heart sounds      Pulmonary/Chest: Effort normal and breath sounds normal

## 2019-02-06 ENCOUNTER — APPOINTMENT (OUTPATIENT)
Dept: LAB | Age: 64
End: 2019-02-06
Payer: COMMERCIAL

## 2019-02-06 DIAGNOSIS — E78.5 DYSLIPIDEMIA: ICD-10-CM

## 2019-02-06 DIAGNOSIS — Z12.5 SCREENING FOR PROSTATE CANCER: ICD-10-CM

## 2019-02-06 DIAGNOSIS — R53.83 FATIGUE, UNSPECIFIED TYPE: ICD-10-CM

## 2019-02-06 LAB
ALBUMIN SERPL BCP-MCNC: 3.9 G/DL (ref 3.5–5)
ALP SERPL-CCNC: 77 U/L (ref 46–116)
ALT SERPL W P-5'-P-CCNC: 39 U/L (ref 12–78)
ANION GAP SERPL CALCULATED.3IONS-SCNC: 8 MMOL/L (ref 4–13)
AST SERPL W P-5'-P-CCNC: 22 U/L (ref 5–45)
BILIRUB SERPL-MCNC: 0.46 MG/DL (ref 0.2–1)
BUN SERPL-MCNC: 15 MG/DL (ref 5–25)
CALCIUM SERPL-MCNC: 8.7 MG/DL (ref 8.3–10.1)
CHLORIDE SERPL-SCNC: 108 MMOL/L (ref 100–108)
CHOLEST SERPL-MCNC: 147 MG/DL (ref 50–200)
CO2 SERPL-SCNC: 24 MMOL/L (ref 21–32)
CREAT SERPL-MCNC: 0.99 MG/DL (ref 0.6–1.3)
ERYTHROCYTE [DISTWIDTH] IN BLOOD BY AUTOMATED COUNT: 13.6 % (ref 11.6–15.1)
GFR SERPL CREATININE-BSD FRML MDRD: 80 ML/MIN/1.73SQ M
GLUCOSE P FAST SERPL-MCNC: 93 MG/DL (ref 65–99)
HCT VFR BLD AUTO: 46 % (ref 36.5–49.3)
HDLC SERPL-MCNC: 62 MG/DL (ref 40–60)
HGB BLD-MCNC: 15 G/DL (ref 12–17)
LDLC SERPL CALC-MCNC: 77 MG/DL (ref 0–100)
MCH RBC QN AUTO: 29.5 PG (ref 26.8–34.3)
MCHC RBC AUTO-ENTMCNC: 32.6 G/DL (ref 31.4–37.4)
MCV RBC AUTO: 91 FL (ref 82–98)
NONHDLC SERPL-MCNC: 85 MG/DL
PLATELET # BLD AUTO: 280 THOUSANDS/UL (ref 149–390)
PMV BLD AUTO: 10.7 FL (ref 8.9–12.7)
POTASSIUM SERPL-SCNC: 4.5 MMOL/L (ref 3.5–5.3)
PROT SERPL-MCNC: 7.6 G/DL (ref 6.4–8.2)
RBC # BLD AUTO: 5.08 MILLION/UL (ref 3.88–5.62)
SODIUM SERPL-SCNC: 140 MMOL/L (ref 136–145)
TRIGL SERPL-MCNC: 40 MG/DL
WBC # BLD AUTO: 6.01 THOUSAND/UL (ref 4.31–10.16)

## 2019-02-06 PROCEDURE — 36415 COLL VENOUS BLD VENIPUNCTURE: CPT

## 2019-02-06 PROCEDURE — 85027 COMPLETE CBC AUTOMATED: CPT

## 2019-02-06 PROCEDURE — 84154 ASSAY OF PSA FREE: CPT

## 2019-02-06 PROCEDURE — 84153 ASSAY OF PSA TOTAL: CPT

## 2019-02-06 PROCEDURE — 80053 COMPREHEN METABOLIC PANEL: CPT

## 2019-02-06 PROCEDURE — 80061 LIPID PANEL: CPT

## 2019-02-07 LAB
PSA FREE MFR SERPL: 28.5 %
PSA FREE SERPL-MCNC: 0.37 NG/ML
PSA SERPL-MCNC: 1.3 NG/ML (ref 0–4)

## 2019-02-26 DIAGNOSIS — E78.5 DYSLIPIDEMIA: ICD-10-CM

## 2019-02-27 RX ORDER — ATORVASTATIN CALCIUM 40 MG/1
60 TABLET, FILM COATED ORAL DAILY
Qty: 135 TABLET | Refills: 1 | Status: SHIPPED | OUTPATIENT
Start: 2019-02-27 | End: 2019-05-17 | Stop reason: SDUPTHER

## 2019-05-17 DIAGNOSIS — E78.5 DYSLIPIDEMIA: ICD-10-CM

## 2019-05-17 RX ORDER — ATORVASTATIN CALCIUM 40 MG/1
60 TABLET, FILM COATED ORAL DAILY
Qty: 45 TABLET | Refills: 0 | Status: SHIPPED | OUTPATIENT
Start: 2019-05-17 | End: 2019-06-12 | Stop reason: SDUPTHER

## 2019-06-05 ENCOUNTER — TELEPHONE (OUTPATIENT)
Dept: FAMILY MEDICINE CLINIC | Facility: CLINIC | Age: 64
End: 2019-06-05

## 2019-06-06 DIAGNOSIS — E78.5 DYSLIPIDEMIA: Primary | ICD-10-CM

## 2019-06-11 ENCOUNTER — APPOINTMENT (OUTPATIENT)
Dept: LAB | Age: 64
End: 2019-06-11
Payer: COMMERCIAL

## 2019-06-11 DIAGNOSIS — E78.5 DYSLIPIDEMIA: ICD-10-CM

## 2019-06-11 LAB
ALBUMIN SERPL BCP-MCNC: 3.7 G/DL (ref 3.5–5)
ALP SERPL-CCNC: 73 U/L (ref 46–116)
ALT SERPL W P-5'-P-CCNC: 29 U/L (ref 12–78)
ANION GAP SERPL CALCULATED.3IONS-SCNC: 4 MMOL/L (ref 4–13)
AST SERPL W P-5'-P-CCNC: 18 U/L (ref 5–45)
BILIRUB SERPL-MCNC: 0.5 MG/DL (ref 0.2–1)
BUN SERPL-MCNC: 12 MG/DL (ref 5–25)
CALCIUM SERPL-MCNC: 8.7 MG/DL (ref 8.3–10.1)
CHLORIDE SERPL-SCNC: 107 MMOL/L (ref 100–108)
CHOLEST SERPL-MCNC: 138 MG/DL (ref 50–200)
CO2 SERPL-SCNC: 26 MMOL/L (ref 21–32)
CREAT SERPL-MCNC: 0.96 MG/DL (ref 0.6–1.3)
GFR SERPL CREATININE-BSD FRML MDRD: 82 ML/MIN/1.73SQ M
GLUCOSE P FAST SERPL-MCNC: 99 MG/DL (ref 65–99)
HDLC SERPL-MCNC: 57 MG/DL (ref 40–60)
LDLC SERPL CALC-MCNC: 64 MG/DL (ref 0–100)
NONHDLC SERPL-MCNC: 81 MG/DL
POTASSIUM SERPL-SCNC: 4.1 MMOL/L (ref 3.5–5.3)
PROT SERPL-MCNC: 7.3 G/DL (ref 6.4–8.2)
SODIUM SERPL-SCNC: 137 MMOL/L (ref 136–145)
TRIGL SERPL-MCNC: 83 MG/DL

## 2019-06-11 PROCEDURE — 80053 COMPREHEN METABOLIC PANEL: CPT

## 2019-06-11 PROCEDURE — 80061 LIPID PANEL: CPT

## 2019-06-11 PROCEDURE — 36415 COLL VENOUS BLD VENIPUNCTURE: CPT

## 2019-06-12 ENCOUNTER — OFFICE VISIT (OUTPATIENT)
Dept: FAMILY MEDICINE CLINIC | Facility: CLINIC | Age: 64
End: 2019-06-12
Payer: COMMERCIAL

## 2019-06-12 VITALS
OXYGEN SATURATION: 98 % | HEART RATE: 69 BPM | WEIGHT: 168 LBS | BODY MASS INDEX: 25.46 KG/M2 | DIASTOLIC BLOOD PRESSURE: 70 MMHG | TEMPERATURE: 96.9 F | RESPIRATION RATE: 16 BRPM | SYSTOLIC BLOOD PRESSURE: 132 MMHG | HEIGHT: 68 IN

## 2019-06-12 DIAGNOSIS — Z86.73 H/O TIA (TRANSIENT ISCHEMIC ATTACK) AND STROKE: Primary | ICD-10-CM

## 2019-06-12 DIAGNOSIS — E78.5 DYSLIPIDEMIA: ICD-10-CM

## 2019-06-12 PROCEDURE — 1036F TOBACCO NON-USER: CPT | Performed by: INTERNAL MEDICINE

## 2019-06-12 PROCEDURE — 1101F PT FALLS ASSESS-DOCD LE1/YR: CPT | Performed by: INTERNAL MEDICINE

## 2019-06-12 PROCEDURE — 3008F BODY MASS INDEX DOCD: CPT | Performed by: INTERNAL MEDICINE

## 2019-06-12 PROCEDURE — 99213 OFFICE O/P EST LOW 20 MIN: CPT | Performed by: INTERNAL MEDICINE

## 2019-06-12 PROCEDURE — 1160F RVW MEDS BY RX/DR IN RCRD: CPT | Performed by: INTERNAL MEDICINE

## 2019-06-12 RX ORDER — ATORVASTATIN CALCIUM 40 MG/1
60 TABLET, FILM COATED ORAL DAILY
Qty: 135 TABLET | Refills: 1 | Status: SHIPPED | OUTPATIENT
Start: 2019-06-12 | End: 2020-01-14 | Stop reason: SDUPTHER

## 2019-09-16 ENCOUNTER — OFFICE VISIT (OUTPATIENT)
Dept: FAMILY MEDICINE CLINIC | Facility: CLINIC | Age: 64
End: 2019-09-16
Payer: COMMERCIAL

## 2019-09-16 VITALS
RESPIRATION RATE: 16 BRPM | SYSTOLIC BLOOD PRESSURE: 134 MMHG | OXYGEN SATURATION: 98 % | HEART RATE: 77 BPM | HEIGHT: 68 IN | WEIGHT: 167 LBS | BODY MASS INDEX: 25.31 KG/M2 | TEMPERATURE: 96.8 F | DIASTOLIC BLOOD PRESSURE: 78 MMHG

## 2019-09-16 DIAGNOSIS — J32.9 SINUSITIS, UNSPECIFIED CHRONICITY, UNSPECIFIED LOCATION: Primary | ICD-10-CM

## 2019-09-16 DIAGNOSIS — J40 BRONCHITIS: ICD-10-CM

## 2019-09-16 PROCEDURE — 99214 OFFICE O/P EST MOD 30 MIN: CPT | Performed by: INTERNAL MEDICINE

## 2019-09-16 RX ORDER — LEVOFLOXACIN 500 MG/1
500 TABLET, FILM COATED ORAL EVERY 24 HOURS
Qty: 10 TABLET | Refills: 0 | Status: SHIPPED | OUTPATIENT
Start: 2019-09-16 | End: 2019-09-26

## 2019-09-16 RX ORDER — MULTIVITAMIN
1 TABLET ORAL DAILY
COMMUNITY

## 2019-09-16 NOTE — PROGRESS NOTES
Assessment/Plan:         Diagnoses and all orders for this visit:    Sinusitis, unspecified chronicity, unspecified location  Comments:  rx levaquin   warned of tendon side affect  Orders:  -     levofloxacin (LEVAQUIN) 500 mg tablet; Take 1 tablet (500 mg total) by mouth every 24 hours for 10 days    Bronchitis  -     levofloxacin (LEVAQUIN) 500 mg tablet; Take 1 tablet (500 mg total) by mouth every 24 hours for 10 days  -     XR chest pa & lateral; Future    Other orders  -     Multiple Vitamin (MULTIVITAMIN) tablet; Take 1 tablet by mouth daily          Subjective:      Patient ID: Edel Gonzalez is a 75 y o  male  Became ill x 1 week ago  sinused were dried  He was exposed to ammounia and developed irritated sinus and cough +sore throat since Friday Saturday +achy/chilly  He called [facility] and rx'd mucinex/flonase/saline nasal spray  Getting worse    Sinusitis   Associated symptoms include chills, coughing, headaches, sinus pressure and a sore throat  The following portions of the patient's history were reviewed and updated as appropriate: He  has a past medical history of H/O TIA (transient ischemic attack) and stroke and Skin lesion of face  He   Patient Active Problem List    Diagnosis Date Noted    H/O TIA (transient ischemic attack) and stroke 06/12/2019     He  has a past surgical history that includes No past surgeries  His family history is not on file  He  reports that he has never smoked  He has never used smokeless tobacco  He reports that he drinks alcohol  He reports that he does not use drugs    Current Outpatient Medications   Medication Sig Dispense Refill    ASPIRIN 81 PO Take 1 tablet by mouth daily      atorvastatin (LIPITOR) 40 mg tablet Take 1 5 tablets (60 mg total) by mouth daily 135 tablet 1    Coenzyme Q10 (CO Q 10 PO) Take by mouth      glucosamine-chondroitin 500-400 MG tablet Take 1 tablet by mouth 3 (three) times a day  0    Multiple Vitamin (MULTIVITAMIN) tablet Take 1 tablet by mouth daily      Omega-3 Fatty Acids (FISH OIL) 1000 MG CPDR Take by mouth 2 (two) times a day  0    Red Yeast Rice Extract (RED YEAST RICE PO) Take by mouth      levofloxacin (LEVAQUIN) 500 mg tablet Take 1 tablet (500 mg total) by mouth every 24 hours for 10 days 10 tablet 0     No current facility-administered medications for this visit  Current Outpatient Medications on File Prior to Visit   Medication Sig    ASPIRIN 81 PO Take 1 tablet by mouth daily    atorvastatin (LIPITOR) 40 mg tablet Take 1 5 tablets (60 mg total) by mouth daily    Coenzyme Q10 (CO Q 10 PO) Take by mouth    glucosamine-chondroitin 500-400 MG tablet Take 1 tablet by mouth 3 (three) times a day    Multiple Vitamin (MULTIVITAMIN) tablet Take 1 tablet by mouth daily    Omega-3 Fatty Acids (FISH OIL) 1000 MG CPDR Take by mouth 2 (two) times a day    Red Yeast Rice Extract (RED YEAST RICE PO) Take by mouth     No current facility-administered medications on file prior to visit  He is allergic to bee venom       Review of Systems   Constitutional: Positive for chills  Negative for fever  HENT: Positive for sinus pressure and sore throat  Respiratory: Positive for cough  Cardiovascular: Negative  Neurological: Positive for headaches  Objective:      /78 (BP Location: Left arm, Patient Position: Sitting, Cuff Size: Standard)   Pulse 77   Temp (!) 96 8 °F (36 °C) (Tympanic)   Resp 16   Ht 5' 8" (1 727 m)   Wt 75 8 kg (167 lb)   SpO2 98%   BMI 25 39 kg/m²          Physical Exam   Constitutional: He appears well-developed and well-nourished  No distress  HENT:   Head: Normocephalic and atraumatic  Right Ear: External ear normal    Left Ear: External ear normal    Nose: Nose normal    Mouth/Throat: Oropharynx is clear and moist  No oropharyngeal exudate  Neck: Normal range of motion  Neck supple  No thyromegaly present     Cardiovascular: Normal rate, regular rhythm and normal heart sounds  Exam reveals no gallop and no friction rub  No murmur heard  Pulmonary/Chest: Effort normal and breath sounds normal  No stridor  No respiratory distress  He has no wheezes  He has no rales  Lymphadenopathy:     He has no cervical adenopathy  Skin: He is not diaphoretic

## 2019-09-26 ENCOUNTER — OFFICE VISIT (OUTPATIENT)
Dept: FAMILY MEDICINE CLINIC | Facility: CLINIC | Age: 64
End: 2019-09-26
Payer: COMMERCIAL

## 2019-09-26 VITALS
TEMPERATURE: 97.4 F | BODY MASS INDEX: 25.37 KG/M2 | HEIGHT: 68 IN | HEART RATE: 78 BPM | WEIGHT: 167.4 LBS | DIASTOLIC BLOOD PRESSURE: 76 MMHG | OXYGEN SATURATION: 97 % | SYSTOLIC BLOOD PRESSURE: 124 MMHG

## 2019-09-26 DIAGNOSIS — S69.92XA THUMB INJURY, LEFT, INITIAL ENCOUNTER: ICD-10-CM

## 2019-09-26 DIAGNOSIS — S69.91XA THUMB INJURY, RIGHT, INITIAL ENCOUNTER: Primary | ICD-10-CM

## 2019-09-26 PROCEDURE — 3008F BODY MASS INDEX DOCD: CPT | Performed by: FAMILY MEDICINE

## 2019-09-26 PROCEDURE — 99213 OFFICE O/P EST LOW 20 MIN: CPT | Performed by: FAMILY MEDICINE

## 2019-09-26 NOTE — PROGRESS NOTES
Assessment/Plan:         Diagnoses and all orders for this visit:    Thumb injury, right, initial encounter  Comments:  if fxm send to ortho, otherwise, support brace until healed  Orders:  -     XR thumb right first digit-min 2v; Future  -     XR wrist 3+ vw right; Future    Thumb injury, left, initial encounter  -     XR thumb left first digit-min 2v; Future          Subjective:      Patient ID: Gus Marroquin is a 75 y o  male  About 6 weeks ago, was rollerbalding, wearing wrist guards b/l  Stopped to change the channel on his iphone, and fell backwards, feet went forward, landing on outstretched hands b/l  R hand pain> L  Pt R handed  No pain meds  Has been wearing a wrist splint      The following portions of the patient's history were reviewed and updated as appropriate: allergies, current medications, past family history, past medical history, past social history, past surgical history and problem list     Review of Systems   HENT: Negative for congestion  Cardiovascular: Negative for chest pain  Skin: Negative for wound  Neurological: Negative for weakness and numbness  Hematological: Does not bruise/bleed easily  Objective:      /76 (BP Location: Right arm, Patient Position: Sitting, Cuff Size: Standard)   Pulse 78   Temp (!) 97 4 °F (36 3 °C)   Ht 5' 8" (1 727 m)   Wt 75 9 kg (167 lb 6 4 oz)   SpO2 97%   BMI 25 45 kg/m²          Physical Exam   Constitutional: He is oriented to person, place, and time  He appears well-developed and well-nourished  Musculoskeletal: He exhibits edema and tenderness (over the prox R thumb> Lprox thumb, decreased strength on R secondary to pain? no crepitus, skin intact, no wounds)  Neurological: He is alert and oriented to person, place, and time  Vitals reviewed

## 2019-10-14 ENCOUNTER — TELEPHONE (OUTPATIENT)
Dept: FAMILY MEDICINE CLINIC | Facility: CLINIC | Age: 64
End: 2019-10-14

## 2019-10-14 DIAGNOSIS — M79.642 PAIN IN BOTH HANDS: Primary | ICD-10-CM

## 2019-10-14 DIAGNOSIS — M79.641 PAIN IN BOTH HANDS: Primary | ICD-10-CM

## 2019-10-14 NOTE — TELEPHONE ENCOUNTER
Pt called stating that he isn't feeling too much better  He stated you instructed him to call if he'd like to have an MRI done  Pt would like to do so

## 2020-01-14 ENCOUNTER — OFFICE VISIT (OUTPATIENT)
Dept: FAMILY MEDICINE CLINIC | Facility: CLINIC | Age: 65
End: 2020-01-14
Payer: COMMERCIAL

## 2020-01-14 VITALS
TEMPERATURE: 97.3 F | DIASTOLIC BLOOD PRESSURE: 70 MMHG | SYSTOLIC BLOOD PRESSURE: 120 MMHG | HEART RATE: 71 BPM | OXYGEN SATURATION: 99 % | RESPIRATION RATE: 16 BRPM | HEIGHT: 68 IN | BODY MASS INDEX: 26.07 KG/M2 | WEIGHT: 172 LBS

## 2020-01-14 DIAGNOSIS — J06.9 UPPER RESPIRATORY TRACT INFECTION, UNSPECIFIED TYPE: ICD-10-CM

## 2020-01-14 DIAGNOSIS — M19.041 OSTEOARTHRITIS OF BOTH HANDS, UNSPECIFIED OSTEOARTHRITIS TYPE: ICD-10-CM

## 2020-01-14 DIAGNOSIS — Z86.73 H/O TIA (TRANSIENT ISCHEMIC ATTACK) AND STROKE: Primary | ICD-10-CM

## 2020-01-14 DIAGNOSIS — E78.5 DYSLIPIDEMIA: ICD-10-CM

## 2020-01-14 DIAGNOSIS — M19.042 OSTEOARTHRITIS OF BOTH HANDS, UNSPECIFIED OSTEOARTHRITIS TYPE: ICD-10-CM

## 2020-01-14 DIAGNOSIS — R53.83 FATIGUE, UNSPECIFIED TYPE: ICD-10-CM

## 2020-01-14 DIAGNOSIS — Z12.5 SCREENING FOR PROSTATE CANCER: ICD-10-CM

## 2020-01-14 PROCEDURE — 3288F FALL RISK ASSESSMENT DOCD: CPT | Performed by: INTERNAL MEDICINE

## 2020-01-14 PROCEDURE — 1036F TOBACCO NON-USER: CPT | Performed by: INTERNAL MEDICINE

## 2020-01-14 PROCEDURE — 1160F RVW MEDS BY RX/DR IN RCRD: CPT | Performed by: INTERNAL MEDICINE

## 2020-01-14 PROCEDURE — 3008F BODY MASS INDEX DOCD: CPT | Performed by: INTERNAL MEDICINE

## 2020-01-14 PROCEDURE — 1100F PTFALLS ASSESS-DOCD GE2>/YR: CPT | Performed by: INTERNAL MEDICINE

## 2020-01-14 PROCEDURE — 99214 OFFICE O/P EST MOD 30 MIN: CPT | Performed by: INTERNAL MEDICINE

## 2020-01-14 RX ORDER — AMOXICILLIN 500 MG/1
500 CAPSULE ORAL EVERY 8 HOURS SCHEDULED
COMMUNITY
End: 2021-01-19

## 2020-01-14 RX ORDER — METHYLPREDNISOLONE 4 MG/1
TABLET ORAL
Qty: 21 EACH | Refills: 0 | Status: SHIPPED | OUTPATIENT
Start: 2020-01-14 | End: 2021-01-19

## 2020-01-14 RX ORDER — ATORVASTATIN CALCIUM 40 MG/1
60 TABLET, FILM COATED ORAL DAILY
Qty: 135 TABLET | Refills: 1 | Status: SHIPPED | OUTPATIENT
Start: 2020-01-14 | End: 2020-06-15

## 2020-01-14 NOTE — PROGRESS NOTES
BMI Counseling: Body mass index is 26 15 kg/m²  The BMI is above normal  Nutrition recommendations include decreasing portion sizes and limiting drinks that contain sugar  Exercise recommendations include exercising 3-5 times per week  No pharmacotherapy was ordered  Patient referred to PCP due to patient being overweight  Falls Plan of Care: balance, strength, and gait training instructions were provided  Assessment/Plan:         Diagnoses and all orders for this visit:    H/O TIA (transient ischemic attack) and stroke  Comments:  stable      Screening for prostate cancer  -     PSA, total and free; Future    Dyslipidemia  Comments:  on statin  Orders:  -     atorvastatin (LIPITOR) 40 mg tablet; Take 1 5 tablets (60 mg total) by mouth daily  -     methylPREDNISolone 4 MG tablet therapy pack; Use as directed on package  -     Lipid panel; Future  -     Comprehensive metabolic panel; Future    Fatigue, unspecified type  Comments:  due for lab  Orders:  -     CBC; Future    Osteoarthritis of both hands, unspecified osteoarthritis type  Comments:  discussed can try medrol if need be and nsaid per ortho/  aggravated his thumbs with fall whicle rollerblading    Upper respiratory tract infection, unspecified type  Comments:  amox    Other orders  -     TURMERIC PO; Take by mouth  -     amoxicillin (AMOXIL) 500 mg capsule; Take 500 mg by mouth every 8 (eight) hours          Subjective:      Patient ID: Ayla Reilly is a 75 y o  male  Pt states he is going to [place] next week  He checks his bp at home and it is 130-140/low 80's  Relates fell rollerbladding and hurt b/l thumb  Went to [address] [place] [address] and had xrays  Told djd  He was given meloxicam and better  He request antibiotic for his trip to [facility]   ? Starting with fore throat    He asks if he can try tumeric for thumb inflammation      The following portions of the patient's history were reviewed and updated as appropriate: He  has a past medical history of H/O TIA (transient ischemic attack) and stroke and Skin lesion of face  He   Patient Active Problem List    Diagnosis Date Noted    H/O TIA (transient ischemic attack) and stroke 06/12/2019     He  has a past surgical history that includes No past surgeries and Tooth extraction  His family history is not on file  He  reports that he has never smoked  He has never used smokeless tobacco  He reports that he drinks alcohol  He reports that he does not use drugs  Current Outpatient Medications   Medication Sig Dispense Refill    amoxicillin (AMOXIL) 500 mg capsule Take 500 mg by mouth every 8 (eight) hours      ASPIRIN 81 PO Take 1 tablet by mouth daily      atorvastatin (LIPITOR) 40 mg tablet Take 1 5 tablets (60 mg total) by mouth daily 135 tablet 1    Coenzyme Q10 (CO Q 10 PO) Take by mouth      glucosamine-chondroitin 500-400 MG tablet Take 1 tablet by mouth 3 (three) times a day  0    Multiple Vitamin (MULTIVITAMIN) tablet Take 1 tablet by mouth daily      Omega-3 Fatty Acids (FISH OIL) 1000 MG CPDR Take by mouth 2 (two) times a day  0    Red Yeast Rice Extract (RED YEAST RICE PO) Take by mouth      TURMERIC PO Take by mouth      methylPREDNISolone 4 MG tablet therapy pack Use as directed on package 21 each 0     No current facility-administered medications for this visit        Current Outpatient Medications on File Prior to Visit   Medication Sig    amoxicillin (AMOXIL) 500 mg capsule Take 500 mg by mouth every 8 (eight) hours    ASPIRIN 81 PO Take 1 tablet by mouth daily    Coenzyme Q10 (CO Q 10 PO) Take by mouth    glucosamine-chondroitin 500-400 MG tablet Take 1 tablet by mouth 3 (three) times a day    Multiple Vitamin (MULTIVITAMIN) tablet Take 1 tablet by mouth daily    Omega-3 Fatty Acids (FISH OIL) 1000 MG CPDR Take by mouth 2 (two) times a day    Red Yeast Rice Extract (RED YEAST RICE PO) Take by mouth    TURMERIC PO Take by mouth     No current facility-administered medications on file prior to visit  He is allergic to bee venom       Review of Systems   Constitutional: Negative  Negative for chills and fever  HENT: Positive for congestion and sore throat  Respiratory: Negative  Cardiovascular: Negative  Neurological: Negative for dizziness, weakness and numbness  Objective:      /70 (BP Location: Left arm, Patient Position: Sitting, Cuff Size: Large)   Pulse 71   Temp (!) 97 3 °F (36 3 °C) (Temporal)   Resp 16   Ht 5' 8" (1 727 m)   Wt 78 kg (172 lb)   SpO2 99%   BMI 26 15 kg/m²          Physical Exam   Constitutional: He appears well-developed and well-nourished  No distress  HENT:   Head: Normocephalic and atraumatic  Right Ear: External ear normal    Left Ear: External ear normal    Nose: Nose normal    Mouth/Throat: Oropharynx is clear and moist  No oropharyngeal exudate    +moderate pharyngeal injection   Neck: Normal range of motion  Neck supple  No thyromegaly present  Cardiovascular: Normal rate, regular rhythm, normal heart sounds and intact distal pulses  Exam reveals no gallop and no friction rub  No murmur heard  Pulmonary/Chest: Effort normal and breath sounds normal  No stridor  No respiratory distress  He has no wheezes  He has no rales  Musculoskeletal:   Tenderness to palp base of b/l thumb   Lymphadenopathy:     He has no cervical adenopathy  Skin: He is not diaphoretic

## 2020-02-12 ENCOUNTER — APPOINTMENT (OUTPATIENT)
Dept: LAB | Age: 65
End: 2020-02-12
Payer: COMMERCIAL

## 2020-02-12 DIAGNOSIS — R53.83 FATIGUE, UNSPECIFIED TYPE: ICD-10-CM

## 2020-02-12 DIAGNOSIS — E78.5 DYSLIPIDEMIA: ICD-10-CM

## 2020-02-12 DIAGNOSIS — Z12.5 SCREENING FOR PROSTATE CANCER: ICD-10-CM

## 2020-02-12 LAB
ALBUMIN SERPL BCP-MCNC: 4 G/DL (ref 3.5–5)
ALP SERPL-CCNC: 62 U/L (ref 46–116)
ALT SERPL W P-5'-P-CCNC: 38 U/L (ref 12–78)
ANION GAP SERPL CALCULATED.3IONS-SCNC: 4 MMOL/L (ref 4–13)
AST SERPL W P-5'-P-CCNC: 22 U/L (ref 5–45)
BILIRUB SERPL-MCNC: 0.66 MG/DL (ref 0.2–1)
BUN SERPL-MCNC: 19 MG/DL (ref 5–25)
CALCIUM SERPL-MCNC: 9.1 MG/DL (ref 8.3–10.1)
CHLORIDE SERPL-SCNC: 108 MMOL/L (ref 100–108)
CHOLEST SERPL-MCNC: 171 MG/DL (ref 50–200)
CO2 SERPL-SCNC: 26 MMOL/L (ref 21–32)
CREAT SERPL-MCNC: 1.04 MG/DL (ref 0.6–1.3)
ERYTHROCYTE [DISTWIDTH] IN BLOOD BY AUTOMATED COUNT: 13.5 % (ref 11.6–15.1)
GFR SERPL CREATININE-BSD FRML MDRD: 74 ML/MIN/1.73SQ M
GLUCOSE P FAST SERPL-MCNC: 90 MG/DL (ref 65–99)
HCT VFR BLD AUTO: 47.3 % (ref 36.5–49.3)
HDLC SERPL-MCNC: 63 MG/DL
HGB BLD-MCNC: 15.5 G/DL (ref 12–17)
LDLC SERPL CALC-MCNC: 88 MG/DL (ref 0–100)
MCH RBC QN AUTO: 29.5 PG (ref 26.8–34.3)
MCHC RBC AUTO-ENTMCNC: 32.8 G/DL (ref 31.4–37.4)
MCV RBC AUTO: 90 FL (ref 82–98)
NONHDLC SERPL-MCNC: 108 MG/DL
PLATELET # BLD AUTO: 244 THOUSANDS/UL (ref 149–390)
PMV BLD AUTO: 10.6 FL (ref 8.9–12.7)
POTASSIUM SERPL-SCNC: 4.3 MMOL/L (ref 3.5–5.3)
PROT SERPL-MCNC: 7.7 G/DL (ref 6.4–8.2)
RBC # BLD AUTO: 5.25 MILLION/UL (ref 3.88–5.62)
SODIUM SERPL-SCNC: 138 MMOL/L (ref 136–145)
TRIGL SERPL-MCNC: 100 MG/DL
WBC # BLD AUTO: 5.72 THOUSAND/UL (ref 4.31–10.16)

## 2020-02-12 PROCEDURE — 80061 LIPID PANEL: CPT

## 2020-02-12 PROCEDURE — 85027 COMPLETE CBC AUTOMATED: CPT

## 2020-02-12 PROCEDURE — 84154 ASSAY OF PSA FREE: CPT

## 2020-02-12 PROCEDURE — 84153 ASSAY OF PSA TOTAL: CPT

## 2020-02-12 PROCEDURE — 80053 COMPREHEN METABOLIC PANEL: CPT

## 2020-02-12 PROCEDURE — 36415 COLL VENOUS BLD VENIPUNCTURE: CPT

## 2020-02-13 LAB
PSA FREE MFR SERPL: 39.1 %
PSA FREE SERPL-MCNC: 0.43 NG/ML
PSA SERPL-MCNC: 1.1 NG/ML (ref 0–4)

## 2020-06-15 DIAGNOSIS — E78.5 DYSLIPIDEMIA: ICD-10-CM

## 2020-06-15 RX ORDER — ATORVASTATIN CALCIUM 40 MG/1
TABLET, FILM COATED ORAL
Qty: 135 TABLET | Refills: 1 | Status: SHIPPED | OUTPATIENT
Start: 2020-06-15 | End: 2020-12-22

## 2020-12-22 DIAGNOSIS — E78.5 DYSLIPIDEMIA: ICD-10-CM

## 2020-12-23 RX ORDER — ATORVASTATIN CALCIUM 40 MG/1
60 TABLET, FILM COATED ORAL DAILY
Qty: 45 TABLET | Refills: 0 | Status: SHIPPED | OUTPATIENT
Start: 2020-12-23 | End: 2021-01-14

## 2021-01-13 DIAGNOSIS — E78.5 DYSLIPIDEMIA: ICD-10-CM

## 2021-01-14 RX ORDER — ATORVASTATIN CALCIUM 40 MG/1
TABLET, FILM COATED ORAL
Qty: 45 TABLET | Refills: 0 | Status: SHIPPED | OUTPATIENT
Start: 2021-01-14 | End: 2021-02-11

## 2021-01-19 ENCOUNTER — OFFICE VISIT (OUTPATIENT)
Dept: FAMILY MEDICINE CLINIC | Facility: CLINIC | Age: 66
End: 2021-01-19
Payer: COMMERCIAL

## 2021-01-19 VITALS
RESPIRATION RATE: 16 BRPM | TEMPERATURE: 97.6 F | DIASTOLIC BLOOD PRESSURE: 72 MMHG | OXYGEN SATURATION: 98 % | HEART RATE: 67 BPM | HEIGHT: 68 IN | BODY MASS INDEX: 26.07 KG/M2 | WEIGHT: 172 LBS | SYSTOLIC BLOOD PRESSURE: 118 MMHG

## 2021-01-19 DIAGNOSIS — R53.83 FATIGUE, UNSPECIFIED TYPE: ICD-10-CM

## 2021-01-19 DIAGNOSIS — Z12.5 SCREENING FOR PROSTATE CANCER: ICD-10-CM

## 2021-01-19 DIAGNOSIS — Z86.73 H/O TIA (TRANSIENT ISCHEMIC ATTACK) AND STROKE: Primary | ICD-10-CM

## 2021-01-19 PROCEDURE — 3725F SCREEN DEPRESSION PERFORMED: CPT | Performed by: INTERNAL MEDICINE

## 2021-01-19 PROCEDURE — 1036F TOBACCO NON-USER: CPT | Performed by: INTERNAL MEDICINE

## 2021-01-19 PROCEDURE — 99214 OFFICE O/P EST MOD 30 MIN: CPT | Performed by: INTERNAL MEDICINE

## 2021-01-19 PROCEDURE — 3008F BODY MASS INDEX DOCD: CPT | Performed by: INTERNAL MEDICINE

## 2021-01-19 PROCEDURE — 1160F RVW MEDS BY RX/DR IN RCRD: CPT | Performed by: INTERNAL MEDICINE

## 2021-01-19 NOTE — PROGRESS NOTES
BMI Counseling: Body mass index is 26 15 kg/m²  The BMI is above normal  Nutrition recommendations include decreasing portion sizes and limiting drinks that contain sugar  Exercise recommendations include exercising 3-5 times per week  No pharmacotherapy was ordered  Patient referred to PCP due to patient being overweight        p  Assessment/Plan:         Diagnoses and all orders for this visit:    H/O TIA (transient ischemic attack) and stroke  -     Lipid panel; Future  -     Comprehensive metabolic panel; Future    Screening for prostate cancer  -     PSA, total and free; Future    Fatigue, unspecified type  -     CBC; Future          Subjective:      Patient ID: Rojelio Dancer is a 77 y o  male  Pt denies tia s/s  Reviewed meds  The following portions of the patient's history were reviewed and updated as appropriate: He  has a past medical history of H/O TIA (transient ischemic attack) and stroke and Skin lesion of face  He   Patient Active Problem List    Diagnosis Date Noted    H/O TIA (transient ischemic attack) and stroke 06/12/2019     He  has a past surgical history that includes No past surgeries and Tooth extraction  His family history is not on file  He  reports that he has never smoked  He has never used smokeless tobacco  He reports current alcohol use  He reports that he does not use drugs    Current Outpatient Medications   Medication Sig Dispense Refill    ASPIRIN 81 PO Take 1 tablet by mouth daily      atorvastatin (LIPITOR) 40 mg tablet TAKE 1 AND 1/2 TABLETS BY MOUTH EVERY DAY 45 tablet 0    Coenzyme Q10 (CO Q 10 PO) Take by mouth      glucosamine-chondroitin 500-400 MG tablet Take 1 tablet by mouth 3 (three) times a day  0    Multiple Vitamin (MULTIVITAMIN) tablet Take 1 tablet by mouth daily      Omega-3 Fatty Acids (FISH OIL) 1000 MG CPDR Take by mouth 2 (two) times a day  0    Red Yeast Rice Extract (RED YEAST RICE PO) Take by mouth      TURMERIC PO Take by mouth No current facility-administered medications for this visit  Current Outpatient Medications on File Prior to Visit   Medication Sig    ASPIRIN 81 PO Take 1 tablet by mouth daily    atorvastatin (LIPITOR) 40 mg tablet TAKE 1 AND 1/2 TABLETS BY MOUTH EVERY DAY    Coenzyme Q10 (CO Q 10 PO) Take by mouth    glucosamine-chondroitin 500-400 MG tablet Take 1 tablet by mouth 3 (three) times a day    Multiple Vitamin (MULTIVITAMIN) tablet Take 1 tablet by mouth daily    Omega-3 Fatty Acids (FISH OIL) 1000 MG CPDR Take by mouth 2 (two) times a day    Red Yeast Rice Extract (RED YEAST RICE PO) Take by mouth    TURMERIC PO Take by mouth     No current facility-administered medications on file prior to visit  He is allergic to bee venom       Review of Systems   Constitutional: Negative  HENT: Negative  Respiratory: Negative  Cardiovascular: Negative  Neurological: Negative for headaches  Objective:      /72 (BP Location: Left arm, Patient Position: Sitting, Cuff Size: Large)   Pulse 67   Temp 97 6 °F (36 4 °C) (Temporal)   Resp 16   Ht 5' 8" (1 727 m)   Wt 78 kg (172 lb)   SpO2 98%   BMI 26 15 kg/m²          Physical Exam  HENT:      Head: Normocephalic and atraumatic  Right Ear: Tympanic membrane and ear canal normal       Left Ear: Tympanic membrane and ear canal normal    Neck:      Musculoskeletal: Neck supple  Cardiovascular:      Rate and Rhythm: Normal rate and regular rhythm  Pulmonary:      Effort: Pulmonary effort is normal       Breath sounds: Normal breath sounds  Abdominal:      General: Abdomen is flat  Palpations: Abdomen is soft  Neurological:      Mental Status: He is alert        t denies tia s/s  Reviewed meds

## 2021-02-11 DIAGNOSIS — E78.5 DYSLIPIDEMIA: ICD-10-CM

## 2021-02-11 RX ORDER — ATORVASTATIN CALCIUM 40 MG/1
TABLET, FILM COATED ORAL
Qty: 45 TABLET | Refills: 0 | Status: SHIPPED | OUTPATIENT
Start: 2021-02-11 | End: 2021-03-16

## 2021-02-12 ENCOUNTER — LAB (OUTPATIENT)
Dept: LAB | Age: 66
End: 2021-02-12
Payer: COMMERCIAL

## 2021-02-12 DIAGNOSIS — Z12.5 SCREENING FOR PROSTATE CANCER: ICD-10-CM

## 2021-02-12 DIAGNOSIS — Z86.73 H/O TIA (TRANSIENT ISCHEMIC ATTACK) AND STROKE: ICD-10-CM

## 2021-02-12 DIAGNOSIS — R53.83 FATIGUE, UNSPECIFIED TYPE: ICD-10-CM

## 2021-02-12 LAB
ALBUMIN SERPL BCP-MCNC: 4.3 G/DL (ref 3.5–5)
ALP SERPL-CCNC: 79 U/L (ref 46–116)
ALT SERPL W P-5'-P-CCNC: 37 U/L (ref 12–78)
ANION GAP SERPL CALCULATED.3IONS-SCNC: 3 MMOL/L (ref 4–13)
AST SERPL W P-5'-P-CCNC: 24 U/L (ref 5–45)
BILIRUB SERPL-MCNC: 0.92 MG/DL (ref 0.2–1)
BUN SERPL-MCNC: 16 MG/DL (ref 5–25)
CALCIUM SERPL-MCNC: 9.6 MG/DL (ref 8.3–10.1)
CHLORIDE SERPL-SCNC: 106 MMOL/L (ref 100–108)
CHOLEST SERPL-MCNC: 178 MG/DL (ref 50–200)
CO2 SERPL-SCNC: 30 MMOL/L (ref 21–32)
CREAT SERPL-MCNC: 0.96 MG/DL (ref 0.6–1.3)
ERYTHROCYTE [DISTWIDTH] IN BLOOD BY AUTOMATED COUNT: 13.3 % (ref 11.6–15.1)
GFR SERPL CREATININE-BSD FRML MDRD: 81 ML/MIN/1.73SQ M
GLUCOSE P FAST SERPL-MCNC: 91 MG/DL (ref 65–99)
HCT VFR BLD AUTO: 47.6 % (ref 36.5–49.3)
HDLC SERPL-MCNC: 79 MG/DL
HGB BLD-MCNC: 15.5 G/DL (ref 12–17)
LDLC SERPL CALC-MCNC: 86 MG/DL (ref 0–100)
MCH RBC QN AUTO: 29.6 PG (ref 26.8–34.3)
MCHC RBC AUTO-ENTMCNC: 32.6 G/DL (ref 31.4–37.4)
MCV RBC AUTO: 91 FL (ref 82–98)
NONHDLC SERPL-MCNC: 99 MG/DL
PLATELET # BLD AUTO: 262 THOUSANDS/UL (ref 149–390)
PMV BLD AUTO: 10.5 FL (ref 8.9–12.7)
POTASSIUM SERPL-SCNC: 4.3 MMOL/L (ref 3.5–5.3)
PROT SERPL-MCNC: 7.6 G/DL (ref 6.4–8.2)
RBC # BLD AUTO: 5.23 MILLION/UL (ref 3.88–5.62)
SODIUM SERPL-SCNC: 139 MMOL/L (ref 136–145)
TRIGL SERPL-MCNC: 66 MG/DL
WBC # BLD AUTO: 6.67 THOUSAND/UL (ref 4.31–10.16)

## 2021-02-12 PROCEDURE — 85027 COMPLETE CBC AUTOMATED: CPT

## 2021-02-12 PROCEDURE — 84153 ASSAY OF PSA TOTAL: CPT

## 2021-02-12 PROCEDURE — 80061 LIPID PANEL: CPT

## 2021-02-12 PROCEDURE — 80053 COMPREHEN METABOLIC PANEL: CPT

## 2021-02-12 PROCEDURE — 36415 COLL VENOUS BLD VENIPUNCTURE: CPT

## 2021-02-12 PROCEDURE — 84154 ASSAY OF PSA FREE: CPT

## 2021-02-16 LAB
PSA FREE MFR SERPL: 27.5 %
PSA FREE SERPL-MCNC: 0.11 NG/ML
PSA SERPL-MCNC: 0.4 NG/ML (ref 0–4)

## 2021-03-15 DIAGNOSIS — E78.5 DYSLIPIDEMIA: ICD-10-CM

## 2021-03-16 RX ORDER — ATORVASTATIN CALCIUM 40 MG/1
TABLET, FILM COATED ORAL
Qty: 45 TABLET | Refills: 0 | Status: SHIPPED | OUTPATIENT
Start: 2021-03-16 | End: 2021-04-15

## 2021-04-15 DIAGNOSIS — E78.5 DYSLIPIDEMIA: ICD-10-CM

## 2021-04-15 RX ORDER — ATORVASTATIN CALCIUM 40 MG/1
TABLET, FILM COATED ORAL
Qty: 45 TABLET | Refills: 0 | Status: SHIPPED | OUTPATIENT
Start: 2021-04-15 | End: 2021-05-13

## 2021-05-13 DIAGNOSIS — E78.5 DYSLIPIDEMIA: ICD-10-CM

## 2021-05-13 RX ORDER — ATORVASTATIN CALCIUM 40 MG/1
TABLET, FILM COATED ORAL
Qty: 45 TABLET | Refills: 0 | Status: SHIPPED | OUTPATIENT
Start: 2021-05-13 | End: 2021-06-09

## 2021-06-09 DIAGNOSIS — E78.5 DYSLIPIDEMIA: ICD-10-CM

## 2021-06-09 RX ORDER — ATORVASTATIN CALCIUM 40 MG/1
TABLET, FILM COATED ORAL
Qty: 45 TABLET | Refills: 0 | Status: SHIPPED | OUTPATIENT
Start: 2021-06-09 | End: 2021-07-07

## 2021-07-07 DIAGNOSIS — E78.5 DYSLIPIDEMIA: ICD-10-CM

## 2021-07-07 RX ORDER — ATORVASTATIN CALCIUM 40 MG/1
TABLET, FILM COATED ORAL
Qty: 45 TABLET | Refills: 0 | Status: SHIPPED | OUTPATIENT
Start: 2021-07-07 | End: 2021-08-10

## 2021-08-10 DIAGNOSIS — E78.5 DYSLIPIDEMIA: ICD-10-CM

## 2021-08-10 RX ORDER — ATORVASTATIN CALCIUM 40 MG/1
TABLET, FILM COATED ORAL
Qty: 135 TABLET | Refills: 0 | Status: SHIPPED | OUTPATIENT
Start: 2021-08-10 | End: 2021-10-27

## 2021-10-05 ENCOUNTER — OFFICE VISIT (OUTPATIENT)
Dept: FAMILY MEDICINE CLINIC | Facility: CLINIC | Age: 66
End: 2021-10-05
Payer: COMMERCIAL

## 2021-10-05 VITALS
OXYGEN SATURATION: 98 % | DIASTOLIC BLOOD PRESSURE: 74 MMHG | WEIGHT: 170 LBS | HEIGHT: 68 IN | RESPIRATION RATE: 16 BRPM | SYSTOLIC BLOOD PRESSURE: 124 MMHG | BODY MASS INDEX: 25.76 KG/M2 | HEART RATE: 69 BPM | TEMPERATURE: 97.4 F

## 2021-10-05 DIAGNOSIS — E78.5 DYSLIPIDEMIA: Primary | ICD-10-CM

## 2021-10-05 DIAGNOSIS — Z86.73 H/O TIA (TRANSIENT ISCHEMIC ATTACK) AND STROKE: ICD-10-CM

## 2021-10-05 DIAGNOSIS — R07.89 ATYPICAL CHEST PAIN: ICD-10-CM

## 2021-10-05 PROCEDURE — 1036F TOBACCO NON-USER: CPT | Performed by: INTERNAL MEDICINE

## 2021-10-05 PROCEDURE — 1100F PTFALLS ASSESS-DOCD GE2>/YR: CPT | Performed by: INTERNAL MEDICINE

## 2021-10-05 PROCEDURE — 1160F RVW MEDS BY RX/DR IN RCRD: CPT | Performed by: INTERNAL MEDICINE

## 2021-10-05 PROCEDURE — 3725F SCREEN DEPRESSION PERFORMED: CPT | Performed by: INTERNAL MEDICINE

## 2021-10-05 PROCEDURE — 3288F FALL RISK ASSESSMENT DOCD: CPT | Performed by: INTERNAL MEDICINE

## 2021-10-05 PROCEDURE — 3008F BODY MASS INDEX DOCD: CPT | Performed by: INTERNAL MEDICINE

## 2021-10-05 PROCEDURE — 93000 ELECTROCARDIOGRAM COMPLETE: CPT | Performed by: INTERNAL MEDICINE

## 2021-10-05 PROCEDURE — 99214 OFFICE O/P EST MOD 30 MIN: CPT | Performed by: INTERNAL MEDICINE

## 2021-10-05 RX ORDER — CHOLECALCIFEROL (VITAMIN D3) 125 MCG
2000 CAPSULE ORAL DAILY
COMMUNITY

## 2021-10-06 ENCOUNTER — APPOINTMENT (OUTPATIENT)
Dept: LAB | Age: 66
End: 2021-10-06
Payer: COMMERCIAL

## 2021-10-06 DIAGNOSIS — E78.5 DYSLIPIDEMIA: ICD-10-CM

## 2021-10-06 LAB
ALBUMIN SERPL BCP-MCNC: 4.1 G/DL (ref 3.5–5)
ALP SERPL-CCNC: 74 U/L (ref 46–116)
ALT SERPL W P-5'-P-CCNC: 38 U/L (ref 12–78)
ANION GAP SERPL CALCULATED.3IONS-SCNC: 1 MMOL/L (ref 4–13)
AST SERPL W P-5'-P-CCNC: 21 U/L (ref 5–45)
BILIRUB SERPL-MCNC: 0.86 MG/DL (ref 0.2–1)
BUN SERPL-MCNC: 18 MG/DL (ref 5–25)
CALCIUM SERPL-MCNC: 9.5 MG/DL (ref 8.3–10.1)
CHLORIDE SERPL-SCNC: 106 MMOL/L (ref 100–108)
CHOLEST SERPL-MCNC: 176 MG/DL (ref 50–200)
CO2 SERPL-SCNC: 31 MMOL/L (ref 21–32)
CREAT SERPL-MCNC: 1.08 MG/DL (ref 0.6–1.3)
GFR SERPL CREATININE-BSD FRML MDRD: 70 ML/MIN/1.73SQ M
GLUCOSE P FAST SERPL-MCNC: 92 MG/DL (ref 65–99)
HDLC SERPL-MCNC: 74 MG/DL
LDLC SERPL CALC-MCNC: 88 MG/DL (ref 0–100)
NONHDLC SERPL-MCNC: 102 MG/DL
POTASSIUM SERPL-SCNC: 4.8 MMOL/L (ref 3.5–5.3)
PROT SERPL-MCNC: 8.1 G/DL (ref 6.4–8.2)
SODIUM SERPL-SCNC: 138 MMOL/L (ref 136–145)
TRIGL SERPL-MCNC: 69 MG/DL

## 2021-10-06 PROCEDURE — 36415 COLL VENOUS BLD VENIPUNCTURE: CPT

## 2021-10-06 PROCEDURE — 80061 LIPID PANEL: CPT

## 2021-10-06 PROCEDURE — 80053 COMPREHEN METABOLIC PANEL: CPT

## 2021-10-27 DIAGNOSIS — E78.5 DYSLIPIDEMIA: ICD-10-CM

## 2021-10-27 RX ORDER — ATORVASTATIN CALCIUM 40 MG/1
TABLET, FILM COATED ORAL
Qty: 135 TABLET | Refills: 0 | Status: SHIPPED | OUTPATIENT
Start: 2021-10-27 | End: 2022-01-24

## 2021-11-19 ENCOUNTER — HOSPITAL ENCOUNTER (OUTPATIENT)
Dept: NON INVASIVE DIAGNOSTICS | Facility: CLINIC | Age: 66
End: 2021-11-19
Payer: COMMERCIAL

## 2021-11-19 ENCOUNTER — APPOINTMENT (OUTPATIENT)
Dept: NON INVASIVE DIAGNOSTICS | Facility: CLINIC | Age: 66
End: 2021-11-19
Payer: COMMERCIAL

## 2021-12-09 ENCOUNTER — HOSPITAL ENCOUNTER (OUTPATIENT)
Dept: NON INVASIVE DIAGNOSTICS | Facility: CLINIC | Age: 66
Discharge: HOME | End: 2021-12-09
Payer: COMMERCIAL

## 2021-12-09 VITALS
DIASTOLIC BLOOD PRESSURE: 74 MMHG | SYSTOLIC BLOOD PRESSURE: 124 MMHG | HEART RATE: 63 BPM | BODY MASS INDEX: 25.76 KG/M2 | HEIGHT: 68 IN | WEIGHT: 170 LBS

## 2021-12-09 VITALS
WEIGHT: 170 LBS | HEART RATE: 60 BPM | HEIGHT: 68 IN | OXYGEN SATURATION: 97 % | BODY MASS INDEX: 25.76 KG/M2 | SYSTOLIC BLOOD PRESSURE: 146 MMHG | DIASTOLIC BLOOD PRESSURE: 86 MMHG

## 2021-12-09 DIAGNOSIS — R07.89 ATYPICAL CHEST PAIN: ICD-10-CM

## 2021-12-09 LAB
AORTIC ROOT: 2.9 CM
APICAL FOUR CHAMBER EJECTION FRACTION: 57 %
BASELINE ST DEPRESSION: 0 MM
CHEST PAIN STATEMENT: NORMAL
E WAVE DECELERATION TIME: 256 MS
FRACTIONAL SHORTENING: 30 % (ref 28–44)
GLOBAL LONGITUIDAL STRAIN: -18 %
INTERVENTRICULAR SEPTUM IN DIASTOLE (PARASTERNAL SHORT AXIS VIEW): 0.9 CM
LEFT ATRIUM AREA SYSTOLE SINGLE PLANE A4C: 14.8 CM2
LEFT INTERNAL DIMENSION IN SYSTOLE: 3.3 CM (ref 2.1–4)
LEFT VENTRICULAR INTERNAL DIMENSION IN DIASTOLE: 4.7 CM (ref 4.52–6.73)
LEFT VENTRICULAR POSTERIOR WALL IN END DIASTOLE: 1.1 CM
LEFT VENTRICULAR STROKE VOLUME: 60 ML
MAX DIASTOLIC BP: 84 MMHG
MAX HEART RATE: 134 BPM
MAX HR PERCENT: 88 %
MAX HR: 129 BPM
MAX PREDICTED HEART RATE: 152 BPM
MAX. SYSTOLIC BP: 184 MMHG
MV E'TISSUE VEL-SEP: 8 CM/S
MV PEAK A VEL: 0.67 M/S
MV PEAK E VEL: 68 CM/S
MV STENOSIS PRESSURE HALF TIME: 0 MS
PROTOCOL NAME: NORMAL
RATE PRESSURE PRODUCT: NORMAL
REASON FOR TERMINATION: NORMAL
RIGHT ATRIUM AREA SYSTOLE A4C: 12.4 CM2
RIGHT VENTRICLE ID DIMENSION: 3.6 CM
SL CV LV EF: 60
SL CV PED ECHO LEFT VENTRICLE DIASTOLIC VOLUME (MOD BIPLANE) 2D: 102 ML
SL CV PED ECHO LEFT VENTRICLE SYSTOLIC VOLUME (MOD BIPLANE) 2D: 43 ML
SL CV STRESS RECOVERY BP: NORMAL MMHG
SL CV STRESS RECOVERY HR: 79 BPM
SL CV STRESS RECOVERY O2 SAT: 98 %
SL CV STRESS STAGE REACHED: 4
STRESS ANGINA INDEX: 0
STRESS BASELINE BP: NORMAL MMHG
STRESS BASELINE HR: 68 BPM
STRESS DUKE TREADMILL SCORE: 7
STRESS O2 SAT REST: 97 %
STRESS PEAK HR: 134 BPM
STRESS PERCENT HR: 88 %
STRESS POST ESTIMATED WORKLOAD: 13.4 METS
STRESS POST EXERCISE DUR MIN: 12 MIN
STRESS POST EXERCISE DUR SEC: 0 SEC
STRESS POST O2 SAT PEAK: 98 %
STRESS POST PEAK BP: 178 MMHG
STRESS ST DEPRESSION: 1 MM
STRESS TARGET HR: 134 BPM
TARGET HR FORMULA: NORMAL
TEST INDICATION: NORMAL
TIME IN EXERCISE PHASE: NORMAL
TRICUSPID VALVE PEAK REGURGITATION VELOCITY: 2.24 M/S
TRICUSPID VALVE S': 0.7 CM/S
TV PEAK GRADIENT: 20 MMHG
Z-SCORE OF LEFT VENTRICULAR DIMENSION IN END SYSTOLE: -1.59

## 2021-12-09 PROCEDURE — 93306 TTE W/DOPPLER COMPLETE: CPT

## 2021-12-09 PROCEDURE — 93017 CV STRESS TEST TRACING ONLY: CPT

## 2021-12-09 PROCEDURE — 93226 XTRNL ECG REC<48 HR SCAN A/R: CPT

## 2021-12-09 PROCEDURE — 93356 MYOCRD STRAIN IMG SPCKL TRCK: CPT | Performed by: INTERNAL MEDICINE

## 2021-12-09 PROCEDURE — 93306 TTE W/DOPPLER COMPLETE: CPT | Performed by: INTERNAL MEDICINE

## 2021-12-09 PROCEDURE — 93225 XTRNL ECG REC<48 HRS REC: CPT

## 2021-12-09 PROCEDURE — 93016 CV STRESS TEST SUPVJ ONLY: CPT | Performed by: INTERNAL MEDICINE

## 2021-12-09 PROCEDURE — 93018 CV STRESS TEST I&R ONLY: CPT | Performed by: INTERNAL MEDICINE

## 2021-12-09 PROCEDURE — 93356 MYOCRD STRAIN IMG SPCKL TRCK: CPT

## 2021-12-14 PROCEDURE — 93227 XTRNL ECG REC<48 HR R&I: CPT | Performed by: INTERNAL MEDICINE

## 2022-01-24 DIAGNOSIS — E78.5 DYSLIPIDEMIA: ICD-10-CM

## 2022-01-24 RX ORDER — ATORVASTATIN CALCIUM 40 MG/1
TABLET, FILM COATED ORAL
Qty: 135 TABLET | Refills: 0 | Status: SHIPPED | OUTPATIENT
Start: 2022-01-24 | End: 2022-04-25

## 2022-04-25 DIAGNOSIS — E78.5 DYSLIPIDEMIA: ICD-10-CM

## 2022-04-25 RX ORDER — ATORVASTATIN CALCIUM 40 MG/1
TABLET, FILM COATED ORAL
Qty: 135 TABLET | Refills: 0 | Status: SHIPPED | OUTPATIENT
Start: 2022-04-25 | End: 2022-07-25

## 2022-05-10 ENCOUNTER — OFFICE VISIT (OUTPATIENT)
Dept: FAMILY MEDICINE CLINIC | Facility: CLINIC | Age: 67
End: 2022-05-10
Payer: COMMERCIAL

## 2022-05-10 VITALS
WEIGHT: 171 LBS | HEART RATE: 65 BPM | RESPIRATION RATE: 16 BRPM | SYSTOLIC BLOOD PRESSURE: 134 MMHG | DIASTOLIC BLOOD PRESSURE: 76 MMHG | HEIGHT: 68 IN | BODY MASS INDEX: 25.91 KG/M2 | TEMPERATURE: 96.7 F | OXYGEN SATURATION: 98 %

## 2022-05-10 DIAGNOSIS — Z00.00 ANNUAL PHYSICAL EXAM: Primary | ICD-10-CM

## 2022-05-10 DIAGNOSIS — Z86.73 H/O TIA (TRANSIENT ISCHEMIC ATTACK) AND STROKE: ICD-10-CM

## 2022-05-10 PROCEDURE — 1160F RVW MEDS BY RX/DR IN RCRD: CPT | Performed by: FAMILY MEDICINE

## 2022-05-10 PROCEDURE — 3725F SCREEN DEPRESSION PERFORMED: CPT | Performed by: FAMILY MEDICINE

## 2022-05-10 PROCEDURE — 1036F TOBACCO NON-USER: CPT | Performed by: FAMILY MEDICINE

## 2022-05-10 PROCEDURE — 99397 PER PM REEVAL EST PAT 65+ YR: CPT | Performed by: FAMILY MEDICINE

## 2022-05-10 NOTE — PROGRESS NOTES
[address] [place] [address]    NAME: Liyah Gutiérrez  AGE: 74 y o  SEX: male  : 1955     DATE: 5/10/2022     Assessment and Plan:     Problem List Items Addressed This Visit        Other    H/O TIA (transient ischemic attack) and stroke    Relevant Orders    CBC and differential    Lipid Panel with Direct LDL reflex    Annual physical exam - Primary     Annual physical exam completed at this time  Will obtain baseline blood work including CBC, lipid panel             Patient interested in decreasing his atorvastatin dosage from 60 mg to 40 mg  Will obtain lipid panel at this time for evaluation repeat again in 6 months and see if this any worsening control with lower dose of atorvastatin    Immunizations and preventive care screenings were discussed with patient today  Appropriate education was printed on patient's after visit summary  Counseling:  Alcohol/drug use: discussed moderation in alcohol intake, the recommendations for healthy alcohol use, and avoidance of illicit drug use  Dental Health: discussed importance of regular tooth brushing, flossing, and dental visits  Injury prevention: discussed safety/seat belts, safety helmets, smoke detectors, carbon dioxide detectors, and smoking near bedding or upholstery  Sexual health: discussed sexually transmitted diseases, partner selection, use of condoms, avoidance of unintended pregnancy, and contraceptive alternatives  · Exercise: the importance of regular exercise/physical activity was discussed  Recommend exercise 3-5 times per week for at least 30 minutes  BMI Counseling: Body mass index is 26 kg/m²  The BMI is above normal  Nutrition recommendations include reducing portion sizes, 3-5 servings of fruits/vegetables daily and consuming healthier snacks  Exercise recommendations include moderate aerobic physical activity for 150 minutes/week  No follow-ups on file  Chief Complaint:     Chief Complaint   Patient presents with    Follow-up      History of Present Illness:     Adult Annual Physical   Patient here for a comprehensive physical exam  The patient reports no problems  Patient is doing well, last seen on 10/04/2021  Denies any significant chest tightness or chest pain since or last evaluation  Review patient's cardiac evaluation including Holter monitor and stress test   No significant abnormality  Diet and Physical Activity  · Diet/Nutrition: well balanced diet, limited junk food and limited fruits/vegetables  · Exercise: 5 minutes of weight training in the morning  Depression Screening  PHQ-2/9 Depression Screening    Little interest or pleasure in doing things: 0 - not at all  Feeling down, depressed, or hopeless: 0 - not at all  PHQ-2 Score: 0  PHQ-2 Interpretation: Negative depression screen       General Health  · Sleep: sleeps well  · Hearing: normal - bilateral   · Vision: no vision problems  · Dental: no dental visits for >1 year and brushes teeth twice daily   Health  · Symptoms include: none     Review of Systems:     Review of Systems   Constitutional: Negative for chills and fever  HENT: Negative for congestion, rhinorrhea and sore throat  Respiratory: Negative for chest tightness and shortness of breath  Gastrointestinal: Negative for abdominal pain, blood in stool, constipation, diarrhea, nausea and vomiting  Genitourinary: Negative for dysuria and hematuria  Musculoskeletal: Positive for arthralgias  Negative for back pain  Pain in the bilateral thumb, left worse then the right   Skin: Negative for rash  Neurological: Negative for dizziness, light-headedness and headaches  Psychiatric/Behavioral: Negative for sleep disturbance        Past Medical History:     Past Medical History:   Diagnosis Date    H/O TIA (transient ischemic attack) and stroke     last assessed 04/25/17    Skin lesion of face last assessed 09/27/2017      Past Surgical History:     Past Surgical History:   Procedure Laterality Date    NO PAST SURGERIES      TOOTH EXTRACTION        Family History:     No family history on file     Social History:     Social History     Socioeconomic History    Marital status: /Civil Union     Spouse name: None    Number of children: None    Years of education: None    Highest education level: None   Occupational History    None   Tobacco Use    Smoking status: Never Smoker    Smokeless tobacco: Never Used   Vaping Use    Vaping Use: Never used   Substance and Sexual Activity    Alcohol use: Yes     Comment: daily wine    Drug use: Never    Sexual activity: None   Other Topics Concern    None   Social History Narrative    None     Social Determinants of Health     Financial Resource Strain: Not on file   Food Insecurity: Not on file   Transportation Needs: Not on file   Physical Activity: Not on file   Stress: Not on file   Social Connections: Not on file   Intimate Partner Violence: Not on file   Housing Stability: Not on file      Current Medications:     Current Outpatient Medications   Medication Sig Dispense Refill    ASPIRIN 81 PO Take 1 tablet by mouth daily      atorvastatin (LIPITOR) 40 mg tablet TAKE 1 AND 1/2 TABLETS (50MG TOTAL) BY MOUTH EVERY DAY (Patient taking differently: 40 mg Take 1 and 1/2 tablets (60 mg total) by mouth every day ) 135 tablet 0    Cholecalciferol (Vitamin D3) 50 MCG (2000 UT) TABS Take 2,000 Units by mouth daily      Coenzyme Q10 (CO Q 10 PO) Take by mouth      FIBER ADULT GUMMIES PO Take by mouth 4-5 g daily      glucosamine-chondroitin 500-400 MG tablet Take 1 tablet by mouth 3 (three) times a day  0    Multiple Vitamin (MULTIVITAMIN) tablet Take 1 tablet by mouth daily      Omega-3 Fatty Acids (FISH OIL) 1000 MG CPDR Take by mouth 2 (two) times a day  0    Red Yeast Rice Extract (RED YEAST RICE PO) Take by mouth      TURMERIC PO Take by mouth       No current facility-administered medications for this visit  Allergies: Allergies   Allergen Reactions    Bee Venom       Physical Exam:     /76 (BP Location: Right arm, Patient Position: Sitting, Cuff Size: Standard)   Pulse 65   Temp (!) 96 7 °F (35 9 °C) (Temporal)   Resp 16   Ht 5' 8" (1 727 m)   Wt 77 6 kg (171 lb)   SpO2 98%   BMI 26 00 kg/m²     Physical Exam  Vitals reviewed  Constitutional:       General: He is not in acute distress  Appearance: Normal appearance  He is not ill-appearing, toxic-appearing or diaphoretic  Cardiovascular:      Rate and Rhythm: Normal rate and regular rhythm  Pulses: Normal pulses  Heart sounds: Normal heart sounds  No murmur heard  Pulmonary:      Effort: Pulmonary effort is normal  No respiratory distress  Breath sounds: Normal breath sounds  Abdominal:      General: Abdomen is flat  Bowel sounds are normal  There is no distension  Palpations: Abdomen is soft  Musculoskeletal:         General: No swelling, tenderness, deformity or signs of injury  Normal range of motion  Skin:     General: Skin is warm and dry  Capillary Refill: Capillary refill takes less than 2 seconds  Coloration: Skin is not jaundiced  Neurological:      General: No focal deficit present  Mental Status: He is alert and oriented to person, place, and time     Psychiatric:         Mood and Affect: Mood normal           MD Nena Pardo

## 2022-05-10 NOTE — PATIENT INSTRUCTIONS

## 2022-05-10 NOTE — ASSESSMENT & PLAN NOTE
Annual physical exam completed at this time  Will obtain baseline blood work including CBC, lipid panel

## 2022-06-01 ENCOUNTER — APPOINTMENT (OUTPATIENT)
Dept: LAB | Age: 67
End: 2022-06-01
Payer: COMMERCIAL

## 2022-06-01 DIAGNOSIS — Z86.73 H/O TIA (TRANSIENT ISCHEMIC ATTACK) AND STROKE: ICD-10-CM

## 2022-06-01 LAB
BASOPHILS # BLD AUTO: 0.03 THOUSANDS/ΜL (ref 0–0.1)
BASOPHILS NFR BLD AUTO: 1 % (ref 0–1)
CHOLEST SERPL-MCNC: 185 MG/DL
EOSINOPHIL # BLD AUTO: 0.15 THOUSAND/ΜL (ref 0–0.61)
EOSINOPHIL NFR BLD AUTO: 3 % (ref 0–6)
ERYTHROCYTE [DISTWIDTH] IN BLOOD BY AUTOMATED COUNT: 13.7 % (ref 11.6–15.1)
HCT VFR BLD AUTO: 47.3 % (ref 36.5–49.3)
HDLC SERPL-MCNC: 70 MG/DL
HGB BLD-MCNC: 15.5 G/DL (ref 12–17)
IMM GRANULOCYTES # BLD AUTO: 0.01 THOUSAND/UL (ref 0–0.2)
IMM GRANULOCYTES NFR BLD AUTO: 0 % (ref 0–2)
LDLC SERPL CALC-MCNC: 103 MG/DL (ref 0–100)
LYMPHOCYTES # BLD AUTO: 1.99 THOUSANDS/ΜL (ref 0.6–4.47)
LYMPHOCYTES NFR BLD AUTO: 39 % (ref 14–44)
MCH RBC QN AUTO: 29.4 PG (ref 26.8–34.3)
MCHC RBC AUTO-ENTMCNC: 32.8 G/DL (ref 31.4–37.4)
MCV RBC AUTO: 90 FL (ref 82–98)
MONOCYTES # BLD AUTO: 0.42 THOUSAND/ΜL (ref 0.17–1.22)
MONOCYTES NFR BLD AUTO: 8 % (ref 4–12)
NEUTROPHILS # BLD AUTO: 2.46 THOUSANDS/ΜL (ref 1.85–7.62)
NEUTS SEG NFR BLD AUTO: 49 % (ref 43–75)
NRBC BLD AUTO-RTO: 0 /100 WBCS
PLATELET # BLD AUTO: 267 THOUSANDS/UL (ref 149–390)
PMV BLD AUTO: 10.8 FL (ref 8.9–12.7)
RBC # BLD AUTO: 5.27 MILLION/UL (ref 3.88–5.62)
TRIGL SERPL-MCNC: 60 MG/DL
WBC # BLD AUTO: 5.06 THOUSAND/UL (ref 4.31–10.16)

## 2022-06-01 PROCEDURE — 36415 COLL VENOUS BLD VENIPUNCTURE: CPT

## 2022-06-01 PROCEDURE — 80061 LIPID PANEL: CPT

## 2022-06-01 PROCEDURE — 85025 COMPLETE CBC W/AUTO DIFF WBC: CPT

## 2022-07-25 DIAGNOSIS — E78.5 DYSLIPIDEMIA: ICD-10-CM

## 2022-07-25 RX ORDER — ATORVASTATIN CALCIUM 40 MG/1
TABLET, FILM COATED ORAL
Qty: 135 TABLET | Refills: 0 | Status: SHIPPED | OUTPATIENT
Start: 2022-07-25

## 2022-10-29 DIAGNOSIS — E78.5 DYSLIPIDEMIA: ICD-10-CM

## 2022-10-31 RX ORDER — ATORVASTATIN CALCIUM 40 MG/1
TABLET, FILM COATED ORAL
Qty: 135 TABLET | Refills: 0 | Status: SHIPPED | OUTPATIENT
Start: 2022-10-31

## 2022-11-10 ENCOUNTER — OFFICE VISIT (OUTPATIENT)
Dept: FAMILY MEDICINE CLINIC | Facility: CLINIC | Age: 67
End: 2022-11-10

## 2022-11-10 VITALS
SYSTOLIC BLOOD PRESSURE: 134 MMHG | TEMPERATURE: 97.8 F | OXYGEN SATURATION: 97 % | BODY MASS INDEX: 25.67 KG/M2 | HEIGHT: 68 IN | DIASTOLIC BLOOD PRESSURE: 70 MMHG | HEART RATE: 68 BPM | WEIGHT: 169.4 LBS

## 2022-11-10 DIAGNOSIS — E78.2 MIXED HYPERLIPIDEMIA: ICD-10-CM

## 2022-11-10 DIAGNOSIS — E55.9 VITAMIN D DEFICIENCY: ICD-10-CM

## 2022-11-10 DIAGNOSIS — Z86.73 HX OF TRANSIENT ISCHEMIC ATTACK (TIA): Primary | ICD-10-CM

## 2022-11-10 DIAGNOSIS — Z12.5 SCREENING FOR MALIGNANT NEOPLASM OF PROSTATE: ICD-10-CM

## 2022-11-10 PROBLEM — M72.2 PLANTAR FASCIAL FIBROMATOSIS: Status: ACTIVE | Noted: 2017-10-05

## 2022-11-10 PROBLEM — K57.90 DIVERTICULAR DISEASE: Status: ACTIVE | Noted: 2017-10-17

## 2022-11-10 PROBLEM — M17.9 OSTEOARTHRITIS OF KNEE: Status: ACTIVE | Noted: 2022-11-10

## 2022-11-10 PROBLEM — M18.0 ARTHRITIS OF CARPOMETACARPAL (CMC) JOINT OF BOTH THUMBS: Status: ACTIVE | Noted: 2019-10-23

## 2022-11-10 PROBLEM — G56.00 CARPAL TUNNEL SYNDROME: Status: ACTIVE | Noted: 2022-11-10

## 2022-11-10 PROBLEM — N40.0 BENIGN PROSTATIC HYPERPLASIA WITHOUT LOWER URINARY TRACT SYMPTOMS: Status: ACTIVE | Noted: 2017-02-19

## 2022-11-10 NOTE — PROGRESS NOTES
Assessment/Plan:    1  Hx of transient ischemic attack (TIA)  -     CBC and differential; Future; Expected date: 12/02/2022  -     Comprehensive metabolic panel; Future; Expected date: 12/02/2022  -     TSH, 3rd generation; Future; Expected date: 12/02/2022  -     Lipid panel; Future; Expected date: 12/02/2022    2  Mixed hyperlipidemia  -     CBC and differential; Future; Expected date: 12/02/2022  -     Comprehensive metabolic panel; Future; Expected date: 12/02/2022  -     TSH, 3rd generation; Future; Expected date: 12/02/2022  -     Lipid panel; Future; Expected date: 12/02/2022    3  Vitamin D deficiency  -     Vitamin D 25 hydroxy; Future    4  Screening for malignant neoplasm of prostate  -     PSA, Total Screen; Future; Expected date: 12/02/2022        There are no Patient Instructions on file for this visit  No follow-ups on file  Subjective:      Patient ID: Ilia Dominguez is a 69 y o  male  Chief Complaint   Patient presents with   • Follow-up       Here for f/u, s/p TIA in 2019  Has been on high dose statin with no problems  Recently brought the dose down from 60mg to 40mg  Saw Dr Starla Dick, prostate exam yesterday  Colonoscopy scheduled for 2 weeks from now  The following portions of the patient's history were reviewed and updated as appropriate: allergies, current medications, past family history, past medical history, past social history, past surgical history and problem list     Review of Systems   Constitutional: Negative for fatigue and fever  HENT: Negative for congestion  Eyes: Negative for visual disturbance  Respiratory: Negative for chest tightness and shortness of breath  Cardiovascular: Negative for chest pain and palpitations  Gastrointestinal: Negative for abdominal pain  Genitourinary: Negative for difficulty urinating  Musculoskeletal: Negative for arthralgias  Neurological: Negative for headaches  Hematological: Does not bruise/bleed easily  Current Outpatient Medications   Medication Sig Dispense Refill   • ASPIRIN 81 PO Take 1 tablet by mouth daily     • atorvastatin (LIPITOR) 40 mg tablet TAKE 1 AND 1/2 TABLETS BY MOUTH ONE TIME DAILY 135 tablet 0   • Cholecalciferol (Vitamin D3) 50 MCG (2000 UT) TABS Take 2,000 Units by mouth daily     • Coenzyme Q10 (CO Q 10 PO) Take by mouth     • FIBER ADULT GUMMIES PO Take by mouth 4-5 g daily     • glucosamine-chondroitin 500-400 MG tablet Take 1 tablet by mouth 3 (three) times a day  0   • Multiple Vitamin (MULTIVITAMIN) tablet Take 1 tablet by mouth daily     • Omega-3 Fatty Acids (FISH OIL) 1000 MG CPDR Take by mouth 2 (two) times a day  0   • Red Yeast Rice Extract (RED YEAST RICE PO) Take by mouth     • TURMERIC PO Take by mouth       No current facility-administered medications for this visit  Objective:    /70 (BP Location: Right arm, Patient Position: Sitting, Cuff Size: Standard)   Pulse 68   Temp 97 8 °F (36 6 °C)   Ht 5' 8" (1 727 m)   Wt 76 8 kg (169 lb 6 4 oz)   SpO2 97%   BMI 25 76 kg/m²        Physical Exam  Vitals reviewed  Constitutional:       Appearance: Normal appearance  He is well-developed  Cardiovascular:      Rate and Rhythm: Normal rate and regular rhythm  Heart sounds: Normal heart sounds  Pulmonary:      Effort: Pulmonary effort is normal       Breath sounds: Normal breath sounds  Skin:     General: Skin is warm  Neurological:      Mental Status: He is alert and oriented to person, place, and time  Psychiatric:         Mood and Affect: Mood normal          Behavior: Behavior normal          Thought Content:  Thought content normal          Judgment: Judgment normal                 Jay Camp MD

## 2023-01-12 ENCOUNTER — APPOINTMENT (OUTPATIENT)
Dept: LAB | Age: 68
End: 2023-01-12

## 2023-01-12 DIAGNOSIS — Z12.5 SCREENING FOR MALIGNANT NEOPLASM OF PROSTATE: ICD-10-CM

## 2023-01-12 DIAGNOSIS — E78.2 MIXED HYPERLIPIDEMIA: ICD-10-CM

## 2023-01-12 DIAGNOSIS — Z86.73 HX OF TRANSIENT ISCHEMIC ATTACK (TIA): ICD-10-CM

## 2023-01-12 DIAGNOSIS — E55.9 VITAMIN D DEFICIENCY: ICD-10-CM

## 2023-01-12 LAB
25(OH)D3 SERPL-MCNC: 27.9 NG/ML (ref 30–100)
ALBUMIN SERPL BCP-MCNC: 4.1 G/DL (ref 3.5–5)
ALP SERPL-CCNC: 66 U/L (ref 46–116)
ALT SERPL W P-5'-P-CCNC: 35 U/L (ref 12–78)
ANION GAP SERPL CALCULATED.3IONS-SCNC: 3 MMOL/L (ref 4–13)
AST SERPL W P-5'-P-CCNC: 22 U/L (ref 5–45)
BASOPHILS # BLD AUTO: 0.03 THOUSANDS/ÂΜL (ref 0–0.1)
BASOPHILS NFR BLD AUTO: 1 % (ref 0–1)
BILIRUB SERPL-MCNC: 0.71 MG/DL (ref 0.2–1)
BUN SERPL-MCNC: 19 MG/DL (ref 5–25)
CALCIUM SERPL-MCNC: 9.2 MG/DL (ref 8.3–10.1)
CHLORIDE SERPL-SCNC: 106 MMOL/L (ref 96–108)
CHOLEST SERPL-MCNC: 167 MG/DL
CO2 SERPL-SCNC: 29 MMOL/L (ref 21–32)
CREAT SERPL-MCNC: 1.06 MG/DL (ref 0.6–1.3)
EOSINOPHIL # BLD AUTO: 0.22 THOUSAND/ÂΜL (ref 0–0.61)
EOSINOPHIL NFR BLD AUTO: 4 % (ref 0–6)
ERYTHROCYTE [DISTWIDTH] IN BLOOD BY AUTOMATED COUNT: 13.4 % (ref 11.6–15.1)
GFR SERPL CREATININE-BSD FRML MDRD: 71 ML/MIN/1.73SQ M
GLUCOSE P FAST SERPL-MCNC: 101 MG/DL (ref 65–99)
HCT VFR BLD AUTO: 47.4 % (ref 36.5–49.3)
HDLC SERPL-MCNC: 81 MG/DL
HGB BLD-MCNC: 15.4 G/DL (ref 12–17)
IMM GRANULOCYTES # BLD AUTO: 0.01 THOUSAND/UL (ref 0–0.2)
IMM GRANULOCYTES NFR BLD AUTO: 0 % (ref 0–2)
LDLC SERPL CALC-MCNC: 75 MG/DL (ref 0–100)
LYMPHOCYTES # BLD AUTO: 2.35 THOUSANDS/ÂΜL (ref 0.6–4.47)
LYMPHOCYTES NFR BLD AUTO: 46 % (ref 14–44)
MCH RBC QN AUTO: 29.7 PG (ref 26.8–34.3)
MCHC RBC AUTO-ENTMCNC: 32.5 G/DL (ref 31.4–37.4)
MCV RBC AUTO: 91 FL (ref 82–98)
MONOCYTES # BLD AUTO: 0.42 THOUSAND/ÂΜL (ref 0.17–1.22)
MONOCYTES NFR BLD AUTO: 8 % (ref 4–12)
NEUTROPHILS # BLD AUTO: 2.09 THOUSANDS/ÂΜL (ref 1.85–7.62)
NEUTS SEG NFR BLD AUTO: 41 % (ref 43–75)
NONHDLC SERPL-MCNC: 86 MG/DL
NRBC BLD AUTO-RTO: 0 /100 WBCS
PLATELET # BLD AUTO: 271 THOUSANDS/UL (ref 149–390)
PMV BLD AUTO: 10.6 FL (ref 8.9–12.7)
POTASSIUM SERPL-SCNC: 5 MMOL/L (ref 3.5–5.3)
PROT SERPL-MCNC: 7.7 G/DL (ref 6.4–8.4)
PSA SERPL-MCNC: 1.2 NG/ML (ref 0–4)
RBC # BLD AUTO: 5.19 MILLION/UL (ref 3.88–5.62)
SODIUM SERPL-SCNC: 138 MMOL/L (ref 135–147)
TRIGL SERPL-MCNC: 55 MG/DL
TSH SERPL DL<=0.05 MIU/L-ACNC: 1.96 UIU/ML (ref 0.45–4.5)
WBC # BLD AUTO: 5.12 THOUSAND/UL (ref 4.31–10.16)

## 2023-05-31 DIAGNOSIS — E78.5 DYSLIPIDEMIA: ICD-10-CM

## 2023-05-31 RX ORDER — ATORVASTATIN CALCIUM 40 MG/1
60 TABLET, FILM COATED ORAL DAILY
Qty: 135 TABLET | Refills: 1 | Status: SHIPPED | OUTPATIENT
Start: 2023-05-31

## 2023-09-22 ENCOUNTER — RA CDI HCC (OUTPATIENT)
Dept: OTHER | Facility: HOSPITAL | Age: 68
End: 2023-09-22

## 2023-09-26 ENCOUNTER — OFFICE VISIT (OUTPATIENT)
Dept: FAMILY MEDICINE CLINIC | Facility: CLINIC | Age: 68
End: 2023-09-26
Payer: MEDICARE

## 2023-09-26 VITALS
SYSTOLIC BLOOD PRESSURE: 122 MMHG | RESPIRATION RATE: 16 BRPM | HEART RATE: 65 BPM | TEMPERATURE: 97.6 F | OXYGEN SATURATION: 97 % | WEIGHT: 156 LBS | BODY MASS INDEX: 23.64 KG/M2 | HEIGHT: 68 IN | DIASTOLIC BLOOD PRESSURE: 66 MMHG

## 2023-09-26 DIAGNOSIS — M54.31 SCIATICA OF RIGHT SIDE: ICD-10-CM

## 2023-09-26 DIAGNOSIS — Z00.00 MEDICARE ANNUAL WELLNESS VISIT, SUBSEQUENT: Primary | ICD-10-CM

## 2023-09-26 PROCEDURE — 99213 OFFICE O/P EST LOW 20 MIN: CPT | Performed by: INTERNAL MEDICINE

## 2023-09-26 PROCEDURE — G0402 INITIAL PREVENTIVE EXAM: HCPCS | Performed by: INTERNAL MEDICINE

## 2023-09-26 RX ORDER — METHYLPREDNISOLONE 4 MG/1
TABLET ORAL
Qty: 21 EACH | Refills: 0 | Status: SHIPPED | OUTPATIENT
Start: 2023-09-26

## 2023-09-26 NOTE — PROGRESS NOTES
Name: Ade Meeks      : 1955      MRN: [id number]  Encounter Provider: Alanna Owens MD  Encounter Date: 2023   Encounter department: [facility]     1.  Medicare annual wellness visit, subsequent           Subjective      HPI  Review of Systems    Current Outpatient Medications on File Prior to Visit   Medication Sig   • ASPIRIN 81 PO Take 1 tablet by mouth daily   • atorvastatin (LIPITOR) 40 mg tablet Take 1.5 tablets (60 mg total) by mouth daily (Patient taking differently: Take 40 mg by mouth daily)   • Cholecalciferol (Vitamin D3) 50 MCG (2000 UT) TABS Take 2,000 Units by mouth daily   • Coenzyme Q10 (CO Q 10 PO) Take by mouth   • FIBER ADULT GUMMIES PO Take by mouth 4-5 g daily   • glucosamine-chondroitin 500-400 MG tablet Take 1 tablet by mouth 3 (three) times a day   • Multiple Vitamin (MULTIVITAMIN) tablet Take 1 tablet by mouth daily   • Omega-3 Fatty Acids (FISH OIL) 1000 MG CPDR Take by mouth 2 (two) times a day   • Red Yeast Rice Extract (RED YEAST RICE PO) Take by mouth   • TURMERIC PO Take by mouth       Objective     /66 (BP Location: Left arm, Patient Position: Sitting, Cuff Size: Standard)   Pulse 65   Temp 97.6 °F (36.4 °C) (Temporal)   Resp 16   Ht 5' 7.75" (1.721 m)   Wt 70.8 kg (156 lb)   SpO2 97%   BMI 23.90 kg/m²     Physical Exam  Alanna Owens MD  Answers for HPI/ROS submitted by the patient on 2023  How often during the last year have you found that you were not able to stop drinking once you had started?: 0 - never  How often during the last year have you failed to do what was normally expected from you because of drinking?: 0 - never  How often during the last year have you needed a first drink in the morning to get yourself going after a heavy drinking session?: 0 - never  How often during the last year have you had a feeling of guilt or remorse after drinking?: 0 - never  How often during the last year have you been unable to remember what happened the night before because you had been drinking?: 0 - never  Have you or someone else been injured as a result of your drinking?: 0 - no  Has a relative or friend or a doctor or another health worker been concerned about your drinking or suggested you cut down?: 0 - no  How would you rate your overall health?: very good  Compared to last year, how is your physical health?: slightly worse  In general, how satisfied are you with your life?: very satisfied  Compared to last year, how is your eyesight?: same  Compared to last year, how is your hearing?: same  Compared to last year, how is your emotional/mental health?: same  How often is anger a problem for you?: never, rarely  How often do you feel unusually tired/fatigued?: never, rarely  In the past 7 days, how much pain have you experienced?: some  If you answered "some" or "a lot", please rate the severity of your pain on a scale of 1 to 10 (1 being the least severe pain and 10 being the most intense pain). : 1/10  In the past 6 months, have you lost or gained 10 pounds without trying?: No  One or more falls in the last year: Yes  Do you have trouble with the stairs inside or outside your home?: No  Does your home have working smoke alarms?: Yes  Does your home have a carbon monoxide monitor?: Yes  Which safety hazards (if any) have you experienced in your home? Please select all that apply.: none  How would you describe your current diet?  Please select all that apply.: Regular  In addition to prescription medications, are you taking any over-the-counter supplements?: Yes  If yes, what supplements are you taking?: Vitamins  Can you manage your medications?: Yes  Are you currently taking any opioid medications?: No  Can you walk and transfer into and out of your bed and chair?: Yes  Can you dress and groom yourself?: Yes  Can you bathe or shower yourself?: Yes  Can you feed yourself?: Yes  Can you do your laundry/ housekeeping?: Yes  Can you manage your money, pay your bills, and track your expenses?: Yes  Can you make your own meals?: Yes  Can you do your own shopping?: Yes  Within the last 12 months, have you had any hospitalizations or Emergency Department visits?: No  Do you have a living will?: Yes  Do you have a Durable POA (Power of ) for healthcare decisions?: Yes  Do you have an Advanced Directive for end of life decisions?: Yes  How often have you used an illegal drug (including marijuana) or a prescription medication for non-medical reasons in the past year?: never  What is the typical number of drinks you consume in a day?: 3  What is the typical number of drinks you consume in a week?: 21  How often did you have a drink containing alcohol in the past year?: 4 or more times a week  How many drinks did you have on a typical day  when you were drinking in the past year?: 3 to 4  How often did you have 6 or more drinks on one occasion in the past year?: never

## 2023-09-26 NOTE — PROGRESS NOTES
Assessment and Plan:     Problem List Items Addressed This Visit    None       Preventive health issues were discussed with patient, and age appropriate screening tests were ordered as noted in patient's After Visit Summary. Personalized health advice and appropriate referrals for health education or preventive services given if needed, as noted in patient's After Visit Summary.      History of Present Illness:     Patient presents for a Medicare Wellness Visit    HPI   Patient Care Team:  Pooja Velasquez MD as PCP - General (Family Medicine)     Review of Systems:     Review of Systems     Problem List:     Patient Active Problem List   Diagnosis   • H/O TIA (transient ischemic attack) and stroke   • Annual physical exam   • Arthritis of carpometacarpal (CMC) joint of both thumbs   • Benign prostatic hyperplasia without lower urinary tract symptoms   • Carpal tunnel syndrome   • Diverticular disease   • Mixed hyperlipidemia   • Osteoarthritis of knee   • Plantar fascial fibromatosis   • Vitamin D deficiency      Past Medical and Surgical History:     Past Medical History:   Diagnosis Date   • H/O TIA (transient ischemic attack) and stroke     last assessed 04/25/17   • Skin lesion of face     last assessed 09/27/2017     Past Surgical History:   Procedure Laterality Date   • NO PAST SURGERIES     • TOOTH EXTRACTION        Family History:     Family History   Problem Relation Age of Onset   • Cancer Mother         lung, +smoker   • Heart disease Mother    • No Known Problems Father       Social History:     Social History     Socioeconomic History   • Marital status: /Civil Union     Spouse name: None   • Number of children: None   • Years of education: None   • Highest education level: None   Occupational History   • None   Tobacco Use   • Smoking status: Never   • Smokeless tobacco: Never   Vaping Use   • Vaping Use: Never used   Substance and Sexual Activity   • Alcohol use: Yes     Comment: daily wine • Drug use: Never   • Sexual activity: None   Other Topics Concern   • None   Social History Narrative   • None     Social Determinants of Health     Financial Resource Strain: Unknown (9/21/2023)    Overall Financial Resource Strain (CARDIA)    • Difficulty of Paying Living Expenses: Patient refused   Food Insecurity: Not on file   Transportation Needs: Unknown (9/21/2023)    PRAPARE - Transportation    • Lack of Transportation (Medical): Patient refused    • Lack of Transportation (Non-Medical): Patient refused   Physical Activity: Not on file   Stress: Not on file   Social Connections: Not on file   Intimate Partner Violence: Not on file   Housing Stability: Not on file      Medications and Allergies:     Current Outpatient Medications   Medication Sig Dispense Refill   • ASPIRIN 81 PO Take 1 tablet by mouth daily     • atorvastatin (LIPITOR) 40 mg tablet Take 1.5 tablets (60 mg total) by mouth daily (Patient taking differently: Take 40 mg by mouth daily) 135 tablet 1   • Cholecalciferol (Vitamin D3) 50 MCG (2000 UT) TABS Take 2,000 Units by mouth daily     • Coenzyme Q10 (CO Q 10 PO) Take by mouth     • FIBER ADULT GUMMIES PO Take by mouth 4-5 g daily     • glucosamine-chondroitin 500-400 MG tablet Take 1 tablet by mouth 3 (three) times a day  0   • Multiple Vitamin (MULTIVITAMIN) tablet Take 1 tablet by mouth daily     • Omega-3 Fatty Acids (FISH OIL) 1000 MG CPDR Take by mouth 2 (two) times a day  0   • Red Yeast Rice Extract (RED YEAST RICE PO) Take by mouth     • TURMERIC PO Take by mouth       No current facility-administered medications for this visit.      Allergies   Allergen Reactions   • Bee Venom       Immunizations:     Immunization History   Administered Date(s) Administered   • COVID-19 MODERNA VACC 0.5 ML IM 02/10/2021, 03/16/2021, 12/15/2021   • Influenza Split High Dose Preservative Free IM 10/10/2018, 10/08/2019   • Tdap 01/15/2014   • Zoster 04/08/2015      Health Maintenance:         Topic Date Due   • Hepatitis C Screening  Never done   • Colorectal Cancer Screening  11/01/2027         Topic Date Due   • Pneumococcal Vaccine: 65+ Years (1 - PCV) Never done   • COVID-19 Vaccine (4 - Moderna series) 02/09/2022   • Influenza Vaccine (1) 09/01/2023      Medicare Screening Tests and Risk Assessments:     Nany Ann is here for his Subsequent Wellness visit. Health Risk Assessment:   Patient rates overall health as very good. Patient feels that their physical health rating is slightly worse. Patient is very satisfied with their life. Eyesight was rated as same. Hearing was rated as same. Patient feels that their emotional and mental health rating is same. Patients states they are never, rarely angry. Patient states they are never, rarely unusually tired/fatigued. Pain experienced in the last 7 days has been some. Patient's pain rating has been 1/10. Patient states that he has experienced no weight loss or gain in last 6 months. Fall Risk Screening: In the past year, patient has experienced: history of falling in past year      Home Safety:  Patient does not have trouble with stairs inside or outside of their home. Patient has working smoke alarms and has working carbon monoxide detector. Home safety hazards include: none. Nutrition:   Current diet is Regular. Medications:   Patient is currently taking over-the-counter supplements. OTC medications include: Vitamins. Patient is able to manage medications. Activities of Daily Living (ADLs)/Instrumental Activities of Daily Living (IADLs):   Walk and transfer into and out of bed and chair?: Yes  Dress and groom yourself?: Yes    Bathe or shower yourself?: Yes    Feed yourself?  Yes  Do your laundry/housekeeping?: Yes  Manage your money, pay your bills and track your expenses?: Yes  Make your own meals?: Yes    Do your own shopping?: Yes    Previous Hospitalizations:   Any hospitalizations or ED visits within the last 12 months?: No      Advance Care Planning:   Living will: Yes    Durable POA for healthcare: Yes    Advanced directive: Yes      PREVENTIVE SCREENINGS      Cardiovascular Screening:    General: Screening Not Indicated and History Lipid Disorder      Diabetes Screening:     General: Screening Current      Colorectal Cancer Screening:     General: Screening Current      Prostate Cancer Screening:    General: Screening Current      Abdominal Aortic Aneurysm (AAA) Screening:    Risk factors include: age between 70-77 yo        Lung Cancer Screening:     General: Screening Not Indicated    Screening, Brief Intervention, and Referral to Treatment (SBIRT)    Screening  Typical number of drinks in a day: 3  Typical number of drinks in a week: 21  Interpretation: Low risk drinking behavior. AUDIT-C Screenin) How often did you have a drink containing alcohol in the past year? 4 or more times a week  2) How many drinks did you have on a typical day when you were drinking in the past year? 3 to 4  3) How often did you have 6 or more drinks on one occasion in the past year? never    AUDIT-C Score: 4  Interpretation: Score 4-12 (male): POSITIVE screen for alcohol misuse    AUDIT Screenin) How often during the last year have you found that you were not able to stop drinking once you had started? 0 - never  5) How often during the last year have you failed to do what was normally expected from you because of drinking? 0 - never  6) How often during the last year have you needed a first drink in the morning to get yourself going after a heavy drinking session?  0 - never  7) How often during the last year have you had a feeling of guilt or remorse after drinking? 0 - never  8) How often during the last year have you been unable to remember what happened the night before because you had been drinking? 0 - never  9) Have you or someone else been injured as a result of your drinking? 0 - no  10) Has a relative or friend or a doctor or another health worker been concerned about your drinking or suggested you cut down? 0 - no    AUDIT Score: 4  Interpretation: Low risk alcohol consumption    Single Item Drug Screening:  How often have you used an illegal drug (including marijuana) or a prescription medication for non-medical reasons in the past year? never    Single Item Drug Screen Score: 0  Interpretation: Negative screen for possible drug use disorder    No results found.      Physical Exam:     /66 (BP Location: Left arm, Patient Position: Sitting, Cuff Size: Standard)   Pulse 65   Temp 97.6 °F (36.4 °C) (Temporal)   Resp 16   Ht 5' 7.75" (1.721 m)   Wt 70.8 kg (156 lb)   SpO2 97%   BMI 23.90 kg/m²     Physical Exam     Padmaja Gayle MD hand pain/injury

## 2023-09-26 NOTE — PATIENT INSTRUCTIONS
Medicare Preventive Visit Patient Instructions  Thank you for completing your Welcome to Medicare Visit or Medicare Annual Wellness Visit today. Your next wellness visit will be due in one year (9/26/2024). The screening/preventive services that you may require over the next 5-10 years are detailed below. Some tests may not apply to you based off risk factors and/or age. Screening tests ordered at today's visit but not completed yet may show as past due. Also, please note that scanned in results may not display below. Preventive Screenings:  Service Recommendations Previous Testing/Comments   Colorectal Cancer Screening  · Colonoscopy    · Fecal Occult Blood Test (FOBT)/Fecal Immunochemical Test (FIT)  · Fecal DNA/Cologuard Test  · Flexible Sigmoidoscopy Age: 43-73 years old   Colonoscopy: every 10 years (May be performed more frequently if at higher risk)  OR  FOBT/FIT: every 1 year  OR  Cologuard: every 3 years  OR  Sigmoidoscopy: every 5 years  Screening may be recommended earlier than age 39 if at higher risk for colorectal cancer. Also, an individualized decision between you and your healthcare provider will decide whether screening between the ages of 77-80 would be appropriate.  Colonoscopy: 11/01/2017  FOBT/FIT: Not on file  Cologuard: Not on file  Sigmoidoscopy: Not on file    Screening Current     Prostate Cancer Screening Individualized decision between patient and health care provider in men between ages of 53-66   Medicare will cover every 12 months beginning on the day after your 50th birthday PSA: 1.2 ng/mL     Screening Current     Hepatitis C Screening Once for adults born between [address] [place] [address]  More frequently in patients at high risk for Hepatitis C Hep C Antibody: Not on file        Diabetes Screening 1-2 times per year if you're at risk for diabetes or have pre-diabetes Fasting glucose: 101 mg/dL (1/12/2023)  A1C: No results in last 5 years (No results in last 5 years)  Screening Current Cholesterol Screening Once every 5 years if you don't have a lipid disorder. May order more often based on risk factors. Lipid panel: 01/12/2023  Screening Not Indicated  History Lipid Disorder      Other Preventive Screenings Covered by Medicare:  1. Abdominal Aortic Aneurysm (AAA) Screening: covered once if your at risk. You're considered to be at risk if you have a family history of AAA or a male between the age of 70-76 who smoking at least 100 cigarettes in your lifetime. 2. Lung Cancer Screening: covers low dose CT scan once per year if you meet all of the following conditions: (1) Age 48-67; (2) No signs or symptoms of lung cancer; (3) Current smoker or have quit smoking within the last 15 years; (4) You have a tobacco smoking history of at least 20 pack years (packs per day x number of years you smoked); (5) You get a written order from a healthcare provider. 3. Glaucoma Screening: covered annually if you're considered high risk: (1) You have diabetes OR (2) Family history of glaucoma OR (3)  aged 48 and older OR (3)  American aged 72 and older  3. Osteoporosis Screening: covered every 2 years if you meet one of the following conditions: (1) Have a vertebral abnormality; (2) On glucocorticoid therapy for more than 3 months; (3) Have primary hyperparathyroidism; (4) On osteoporosis medications and need to assess response to drug therapy. 5. HIV Screening: covered annually if you're between the age of 14-79. Also covered annually if you are younger than 13 and older than 72 with risk factors for HIV infection. For pregnant patients, it is covered up to 3 times per pregnancy.     Immunizations:  Immunization Recommendations   Influenza Vaccine Annual influenza vaccination during flu season is recommended for all persons aged >= 6 months who do not have contraindications   Pneumococcal Vaccine   * Pneumococcal conjugate vaccine = PCV13 (Prevnar 13), PCV15 (Vaxneuvance), PCV20 (Prevnar 20)  * Pneumococcal polysaccharide vaccine = PPSV23 (Pneumovax) Adults 2364 years old: 1-3 doses may be recommended based on certain risk factors  Adults 72 years old: 1-2 doses may be recommended based off what pneumonia vaccine you previously received   Hepatitis B Vaccine 3 dose series if at intermediate or high risk (ex: diabetes, end stage renal disease, liver disease)   Tetanus (Td) Vaccine - COST NOT COVERED BY MEDICARE PART B Following completion of primary series, a booster dose should be given every 10 years to maintain immunity against tetanus. Td may also be given as tetanus wound prophylaxis. Tdap Vaccine - COST NOT COVERED BY MEDICARE PART B Recommended at least once for all adults. For pregnant patients, recommended with each pregnancy. Shingles Vaccine (Shingrix) - COST NOT COVERED BY MEDICARE PART B  2 shot series recommended in those aged 48 and above     Health Maintenance Due:      Topic Date Due   • Hepatitis C Screening  Never done   • Colorectal Cancer Screening  11/01/2027     Immunizations Due:      Topic Date Due   • Pneumococcal Vaccine: 65+ Years (1 - PCV) Never done   • COVID-19 Vaccine (4 - Moderna series) 02/09/2022   • Influenza Vaccine (1) 09/01/2023     Advance Directives   What are advance directives? Advance directives are legal documents that state your wishes and plans for medical care. These plans are made ahead of time in case you lose your ability to make decisions for yourself. Advance directives can apply to any medical decision, such as the treatments you want, and if you want to donate organs. What are the types of advance directives? There are many types of advance directives, and each state has rules about how to use them. You may choose a combination of any of the following:  · Living will: This is a written record of the treatment you want. You can also choose which treatments you do not want, which to limit, and which to stop at a certain time.  This includes surgery, medicine, IV fluid, and tube feedings. · Durable power of  for healthcare [facility]): This is a written record that states who you want to make healthcare choices for you when you are unable to make them for yourself. This person, called a proxy, is usually a family member or a friend. You may choose more than 1 proxy. · Do not resuscitate (DNR) order:  A DNR order is used in case your heart stops beating or you stop breathing. It is a request not to have certain forms of treatment, such as CPR. A DNR order may be included in other types of advance directives. · Medical directive: This covers the care that you want if you are in a coma, near death, or unable to make decisions for yourself. You can list the treatments you want for each condition. Treatment may include pain medicine, surgery, blood transfusions, dialysis, IV or tube feedings, and a ventilator (breathing machine). · Values history: This document has questions about your views, beliefs, and how you feel and think about life. This information can help others choose the care that you would choose. Why are advance directives important? An advance directive helps you control your care. Although spoken wishes may be used, it is better to have your wishes written down. Spoken wishes can be misunderstood, or not followed. Treatments may be given even if you do not want them. An advance directive may make it easier for your family to make difficult choices about your care. Fall Prevention    Fall prevention  includes ways to make your home and other areas safer. It also includes ways you can move more carefully to prevent a fall. Health conditions that cause changes in your blood pressure, vision, or muscle strength and coordination may increase your risk for falls. Medicines may also increase your risk for falls if they make you dizzy, weak, or sleepy. Fall prevention tips:   · Stand or sit up slowly.     · Use assistive devices as directed. · Wear shoes that fit well and have soles that . · Wear a personal alarm. · Stay active. · Manage your medical conditions. Home Safety Tips:  · Add items to prevent falls in the bathroom. · Keep paths clear. · Install bright lights in your home. · Keep items you use often on shelves within reach. · Paint or place reflective tape on the edges of your stairs. Alcohol Use and Your Health    Drinking too much can harm your health. Excessive alcohol use leads to about 88,000 death in [facility] each year, and shortens the life of those who diet by almost 30 years. Further, excessive drinking cost the economy $249 billion in 2010. Most excessive drinkers are not alcohol dependent. Excessive alcohol use has immediate effects that increase the risk of many harmful health conditions. These are most often the result of binge drinking. Over time, excessive alcohol use can lead to the development of chronic diseases and other series health problems. What is considered a "drink"? Excessive alcohol use includes:  · Binge Drinking: For women, 4 or more drinks consumed on one occasion. For men, 5 or more drinks consumed on one occasion. · Heavy Drinking: For women, 8 or more drinks per week. For men, 15 or more drinks per week  · Any alcohol used by pregnant women  · Any alcohol used by those under the age of 21 years    If you choose to drink, do so in moderation:  · Do not drink at all if you are under the age of 24, or if you are or may be pregnant, or have health problems that could be made worse by drinking.   · For women, up to 1 drink per day  · For men, up to 2 drinks a day    No one should begin drinking or drink more frequently based on potential health benefits    Short-Term Health Risks:  · Injuries: motor vehicle crashes, falls, drownings, burns  · Violence: homicide, suicide, sexual assault, intimate partner violence  · Alcohol poisoning  · Reproductive health: risky sexual behaviors, unintended prengnacy, sexually transmitted diseases, miscarriage, stillbirth, fetal alcohol syndrome    Long-Term Health Risks:  · Chronic diseases: high blood pressure, heart disease, stroke, liver disease, digestive problems  · Cancers: breast, mouth and throat, liver, colon  · Learning and memory problems: dementia, poor school performance  · Mental health: depression, anxiety, insomnia  · Social problems: lost productivity, family problems, unemployment  · Alcohol dependence    For support and more information:  · Substance Abuse and [facility] , [address] [place] [address]  Web Address: https://Isowalk/    · Alcoholics Anonymous        Web Address: http://www.[employer].info/    https://www.cdc.gov/alcohol/fact-sheets/alcohol-use.htm     © [employer] 2018 Information is for End User's use only and may not be sold, redistributed or otherwise used for commercial purposes. All illustrations and images included in CareNotes® are the copyrighted property of [employer] or [facility] Preventive Visit Patient Instructions  Thank you for completing your Welcome to Medicare Visit or Medicare Annual Wellness Visit today. Your next wellness visit will be due in one year (9/26/2024). The screening/preventive services that you may require over the next 5-10 years are detailed below. Some tests may not apply to you based off risk factors and/or age. Screening tests ordered at today's visit but not completed yet may show as past due. Also, please note that scanned in results may not display below.   Preventive Screenings:  Service Recommendations Previous Testing/Comments   Colorectal Cancer Screening  · Colonoscopy    · Fecal Occult Blood Test (FOBT)/Fecal Immunochemical Test (FIT)  · Fecal DNA/Cologuard Test  · Flexible Sigmoidoscopy Age: 43-73 years old   Colonoscopy: every 10 years (May be performed more frequently if at higher risk)  OR  FOBT/FIT: every 1 year  OR  Cologuard: every 3 years  OR  Sigmoidoscopy: every 5 years  Screening may be recommended earlier than age 39 if at higher risk for colorectal cancer. Also, an individualized decision between you and your healthcare provider will decide whether screening between the ages of 77-80 would be appropriate. Colonoscopy: 11/01/2017  FOBT/FIT: Not on file  Cologuard: Not on file  Sigmoidoscopy: Not on file    Screening Current     Prostate Cancer Screening Individualized decision between patient and health care provider in men between ages of 53-66   Medicare will cover every 12 months beginning on the day after your 50th birthday PSA: 1.2 ng/mL     Screening Current     Hepatitis C Screening Once for adults born between [address] [place] [address]  More frequently in patients at high risk for Hepatitis C Hep C Antibody: Not on file        Diabetes Screening 1-2 times per year if you're at risk for diabetes or have pre-diabetes Fasting glucose: 101 mg/dL (1/12/2023)  A1C: No results in last 5 years (No results in last 5 years)  Screening Current   Cholesterol Screening Once every 5 years if you don't have a lipid disorder. May order more often based on risk factors. Lipid panel: 01/12/2023  Screening Not Indicated  History Lipid Disorder      Other Preventive Screenings Covered by Medicare:  6. Abdominal Aortic Aneurysm (AAA) Screening: covered once if your at risk. You're considered to be at risk if you have a family history of AAA or a male between the age of 70-76 who smoking at least 100 cigarettes in your lifetime.   7. Lung Cancer Screening: covers low dose CT scan once per year if you meet all of the following conditions: (1) Age 48-67; (2) No signs or symptoms of lung cancer; (3) Current smoker or have quit smoking within the last 15 years; (4) You have a tobacco smoking history of at least 20 pack years (packs per day x number of years you smoked); (5) You get a written order from a healthcare provider. 8. Glaucoma Screening: covered annually if you're considered high risk: (1) You have diabetes OR (2) Family history of glaucoma OR (3)  aged 48 and older OR (3)  American aged 72 and older  5. Osteoporosis Screening: covered every 2 years if you meet one of the following conditions: (1) Have a vertebral abnormality; (2) On glucocorticoid therapy for more than 3 months; (3) Have primary hyperparathyroidism; (4) On osteoporosis medications and need to assess response to drug therapy. 10. HIV Screening: covered annually if you're between the age of 14-79. Also covered annually if you are younger than 13 and older than 72 with risk factors for HIV infection. For pregnant patients, it is covered up to 3 times per pregnancy. Immunizations:  Immunization Recommendations   Influenza Vaccine Annual influenza vaccination during flu season is recommended for all persons aged >= 6 months who do not have contraindications   Pneumococcal Vaccine   * Pneumococcal conjugate vaccine = PCV13 (Prevnar 13), PCV15 (Vaxneuvance), PCV20 (Prevnar 20)  * Pneumococcal polysaccharide vaccine = PPSV23 (Pneumovax) Adults 20-63 years old: 1-3 doses may be recommended based on certain risk factors  Adults 72 years old: 1-2 doses may be recommended based off what pneumonia vaccine you previously received   Hepatitis B Vaccine 3 dose series if at intermediate or high risk (ex: diabetes, end stage renal disease, liver disease)   Tetanus (Td) Vaccine - COST NOT COVERED BY MEDICARE PART B Following completion of primary series, a booster dose should be given every 10 years to maintain immunity against tetanus. Td may also be given as tetanus wound prophylaxis. Tdap Vaccine - COST NOT COVERED BY MEDICARE PART B Recommended at least once for all adults. For pregnant patients, recommended with each pregnancy.    Shingles Vaccine (Shingrix) - COST NOT COVERED BY MEDICARE PART B  2 shot series recommended in those aged 48 and above     Health Maintenance Due:      Topic Date Due   • Hepatitis C Screening  Never done   • Colorectal Cancer Screening  11/01/2027     Immunizations Due:      Topic Date Due   • Pneumococcal Vaccine: 65+ Years (1 - PCV) Never done   • COVID-19 Vaccine (4 - Moderna series) 02/09/2022   • Influenza Vaccine (1) 09/01/2023     Advance Directives   What are advance directives? Advance directives are legal documents that state your wishes and plans for medical care. These plans are made ahead of time in case you lose your ability to make decisions for yourself. Advance directives can apply to any medical decision, such as the treatments you want, and if you want to donate organs. What are the types of advance directives? There are many types of advance directives, and each state has rules about how to use them. You may choose a combination of any of the following:  · Living will: This is a written record of the treatment you want. You can also choose which treatments you do not want, which to limit, and which to stop at a certain time. This includes surgery, medicine, IV fluid, and tube feedings. · Durable power of  for healthcare [facility]): This is a written record that states who you want to make healthcare choices for you when you are unable to make them for yourself. This person, called a proxy, is usually a family member or a friend. You may choose more than 1 proxy. · Do not resuscitate (DNR) order:  A DNR order is used in case your heart stops beating or you stop breathing. It is a request not to have certain forms of treatment, such as CPR. A DNR order may be included in other types of advance directives. · Medical directive: This covers the care that you want if you are in a coma, near death, or unable to make decisions for yourself. You can list the treatments you want for each condition.  Treatment may include pain medicine, surgery, blood transfusions, dialysis, IV or tube feedings, and a ventilator (breathing machine). · Values history: This document has questions about your views, beliefs, and how you feel and think about life. This information can help others choose the care that you would choose. Why are advance directives important? An advance directive helps you control your care. Although spoken wishes may be used, it is better to have your wishes written down. Spoken wishes can be misunderstood, or not followed. Treatments may be given even if you do not want them. An advance directive may make it easier for your family to make difficult choices about your care. Fall Prevention    Fall prevention  includes ways to make your home and other areas safer. It also includes ways you can move more carefully to prevent a fall. Health conditions that cause changes in your blood pressure, vision, or muscle strength and coordination may increase your risk for falls. Medicines may also increase your risk for falls if they make you dizzy, weak, or sleepy. Fall prevention tips:   · Stand or sit up slowly. · Use assistive devices as directed. · Wear shoes that fit well and have soles that . · Wear a personal alarm. · Stay active. · Manage your medical conditions. Home Safety Tips:  · Add items to prevent falls in the bathroom. · Keep paths clear. · Install bright lights in your home. · Keep items you use often on shelves within reach. · Paint or place reflective tape on the edges of your stairs. Alcohol Use and Your Health    Drinking too much can harm your health. Excessive alcohol use leads to about 88,000 death in the [facility] each year, and shortens the life of those who diet by almost 30 years. Further, excessive drinking cost the economy $249 billion in 2010. Most excessive drinkers are not alcohol dependent.     Excessive alcohol use has immediate effects that increase the risk of many harmful health conditions. These are most often the result of binge drinking. Over time, excessive alcohol use can lead to the development of chronic diseases and other series health problems. What is considered a "drink"? Excessive alcohol use includes:  · Binge Drinking: For women, 4 or more drinks consumed on one occasion. For men, 5 or more drinks consumed on one occasion. · Heavy Drinking: For women, 8 or more drinks per week. For men, 15 or more drinks per week  · Any alcohol used by pregnant women  · Any alcohol used by those under the age of 21 years    If you choose to drink, do so in moderation:  · Do not drink at all if you are under the age of 24, or if you are or may be pregnant, or have health problems that could be made worse by drinking.   · For women, up to 1 drink per day  · For men, up to 2 drinks a day    No one should begin drinking or drink more frequently based on potential health benefits    Short-Term Health Risks:  · Injuries: motor vehicle crashes, falls, drownings, burns  · Violence: homicide, suicide, sexual assault, intimate partner violence  · Alcohol poisoning  · Reproductive health: risky sexual behaviors, unintended prengnacy, sexually transmitted diseases, miscarriage, stillbirth, fetal alcohol syndrome    Long-Term Health Risks:  · Chronic diseases: high blood pressure, heart disease, stroke, liver disease, digestive problems  · Cancers: breast, mouth and throat, liver, colon  · Learning and memory problems: dementia, poor school performance  · Mental health: depression, anxiety, insomnia  · Social problems: lost productivity, family problems, unemployment  · Alcohol dependence    For support and more information:  · Substance Abuse and [address] [place], [place] [address]  Web Address: https://Zeuss/    · Alcoholics Anonymous        Web Address: http://www.[facility]/    https://www.cdc.gov/alcohol/fact-sheets/alcohol-use.htm     © Copyright [employer] 2018 Information is for [employer] use only and may not be sold, redistributed or otherwise used for commercial purposes. All illustrations and images included in CareNotes® are the copyrighted property of [employer] Inc. or [facility] Preventive Visit Patient Instructions  Thank you for completing your Welcome to Medicare Visit or Medicare Annual Wellness Visit today. Your next wellness visit will be due in one year (9/26/2024). The screening/preventive services that you may require over the next 5-10 years are detailed below. Some tests may not apply to you based off risk factors and/or age. Screening tests ordered at today's visit but not completed yet may show as past due. Also, please note that scanned in results may not display below. Preventive Screenings:  Service Recommendations Previous Testing/Comments   Colorectal Cancer Screening  · Colonoscopy    · Fecal Occult Blood Test (FOBT)/Fecal Immunochemical Test (FIT)  · Fecal DNA/Cologuard Test  · Flexible Sigmoidoscopy Age: 43-73 years old   Colonoscopy: every 10 years (May be performed more frequently if at higher risk)  OR  FOBT/FIT: every 1 year  OR  Cologuard: every 3 years  OR  Sigmoidoscopy: every 5 years  Screening may be recommended earlier than age 39 if at higher risk for colorectal cancer. Also, an individualized decision between you and your healthcare provider will decide whether screening between the ages of 77-80 would be appropriate.  Colonoscopy: 11/01/2017  FOBT/FIT: Not on file  Cologuard: Not on file  Sigmoidoscopy: Not on file    Screening Current     Prostate Cancer Screening Individualized decision between patient and health care provider in men between ages of 53-66   Medicare will cover every 12 months beginning on the day after your 50th birthday PSA: 1.2 ng/mL     Screening Current     Hepatitis C Screening Once for adults born between [address] [place] [address]  More frequently in patients at high risk for Hepatitis C Hep C Antibody: Not on file        Diabetes Screening 1-2 times per year if you're at risk for diabetes or have pre-diabetes Fasting glucose: 101 mg/dL (1/12/2023)  A1C: No results in last 5 years (No results in last 5 years)  Screening Current   Cholesterol Screening Once every 5 years if you don't have a lipid disorder. May order more often based on risk factors. Lipid panel: 01/12/2023  Screening Not Indicated  History Lipid Disorder      Other Preventive Screenings Covered by Medicare:  11. Abdominal Aortic Aneurysm (AAA) Screening: covered once if your at risk. You're considered to be at risk if you have a family history of AAA or a male between the age of 70-76 who smoking at least 100 cigarettes in your lifetime. 12. Lung Cancer Screening: covers low dose CT scan once per year if you meet all of the following conditions: (1) Age 48-67; (2) No signs or symptoms of lung cancer; (3) Current smoker or have quit smoking within the last 15 years; (4) You have a tobacco smoking history of at least 20 pack years (packs per day x number of years you smoked); (5) You get a written order from a healthcare provider. 15. Glaucoma Screening: covered annually if you're considered high risk: (1) You have diabetes OR (2) Family history of glaucoma OR (3)  aged 48 and older OR (3)  American aged 72 and older  15. Osteoporosis Screening: covered every 2 years if you meet one of the following conditions: (1) Have a vertebral abnormality; (2) On glucocorticoid therapy for more than 3 months; (3) Have primary hyperparathyroidism; (4) On osteoporosis medications and need to assess response to drug therapy. 15. HIV Screening: covered annually if you're between the age of 14-79. Also covered annually if you are younger than 13 and older than 72 with risk factors for HIV infection. For pregnant patients, it is covered up to 3 times per pregnancy.     Immunizations:  Immunization Recommendations   Influenza Vaccine Annual influenza vaccination during flu season is recommended for all persons aged >= 6 months who do not have contraindications   Pneumococcal Vaccine   * Pneumococcal conjugate vaccine = PCV13 (Prevnar 13), PCV15 (Vaxneuvance), PCV20 (Prevnar 20)  * Pneumococcal polysaccharide vaccine = PPSV23 (Pneumovax) Adults 20-63 years old: 1-3 doses may be recommended based on certain risk factors  Adults 72 years old: 1-2 doses may be recommended based off what pneumonia vaccine you previously received   Hepatitis B Vaccine 3 dose series if at intermediate or high risk (ex: diabetes, end stage renal disease, liver disease)   Tetanus (Td) Vaccine - COST NOT COVERED BY MEDICARE PART B Following completion of primary series, a booster dose should be given every 10 years to maintain immunity against tetanus. Td may also be given as tetanus wound prophylaxis. Tdap Vaccine - COST NOT COVERED BY MEDICARE PART B Recommended at least once for all adults. For pregnant patients, recommended with each pregnancy. Shingles Vaccine (Shingrix) - COST NOT COVERED BY MEDICARE PART B  2 shot series recommended in those aged 48 and above     Health Maintenance Due:      Topic Date Due   • Hepatitis C Screening  Never done   • Colorectal Cancer Screening  11/01/2027     Immunizations Due:      Topic Date Due   • Pneumococcal Vaccine: 65+ Years (1 - PCV) Never done   • COVID-19 Vaccine (4 - Moderna series) 02/09/2022   • Influenza Vaccine (1) 09/01/2023     Advance Directives   What are advance directives? Advance directives are legal documents that state your wishes and plans for medical care. These plans are made ahead of time in case you lose your ability to make decisions for yourself. Advance directives can apply to any medical decision, such as the treatments you want, and if you want to donate organs. What are the types of advance directives?   There are many types of advance directives, and each state has rules about how to use them. You may choose a combination of any of the following:  · Living will: This is a written record of the treatment you want. You can also choose which treatments you do not want, which to limit, and which to stop at a certain time. This includes surgery, medicine, IV fluid, and tube feedings. · Durable power of  for healthcare [facility]): This is a written record that states who you want to make healthcare choices for you when you are unable to make them for yourself. This person, called a proxy, is usually a family member or a friend. You may choose more than 1 proxy. · Do not resuscitate (DNR) order:  A DNR order is used in case your heart stops beating or you stop breathing. It is a request not to have certain forms of treatment, such as CPR. A DNR order may be included in other types of advance directives. · Medical directive: This covers the care that you want if you are in a coma, near death, or unable to make decisions for yourself. You can list the treatments you want for each condition. Treatment may include pain medicine, surgery, blood transfusions, dialysis, IV or tube feedings, and a ventilator (breathing machine). · Values history: This document has questions about your views, beliefs, and how you feel and think about life. This information can help others choose the care that you would choose. Why are advance directives important? An advance directive helps you control your care. Although spoken wishes may be used, it is better to have your wishes written down. Spoken wishes can be misunderstood, or not followed. Treatments may be given even if you do not want them. An advance directive may make it easier for your family to make difficult choices about your care. Fall Prevention    Fall prevention  includes ways to make your home and other areas safer. It also includes ways you can move more carefully to prevent a fall.  Health conditions that cause changes in your blood pressure, vision, or muscle strength and coordination may increase your risk for falls. Medicines may also increase your risk for falls if they make you dizzy, weak, or sleepy. Fall prevention tips:   · Stand or sit up slowly. · Use assistive devices as directed. · Wear shoes that fit well and have soles that . · Wear a personal alarm. · Stay active. · Manage your medical conditions. Home Safety Tips:  · Add items to prevent falls in the bathroom. · Keep paths clear. · Install bright lights in your home. · Keep items you use often on shelves within reach. · Paint or place reflective tape on the edges of your stairs. Alcohol Use and Your Health    Drinking too much can harm your health. Excessive alcohol use leads to about 88,000 death in [facility] each year, and shortens the life of those who diet by almost 30 years. Further, excessive drinking cost the economy $249 billion in 2010. Most excessive drinkers are not alcohol dependent. Excessive alcohol use has immediate effects that increase the risk of many harmful health conditions. These are most often the result of binge drinking. Over time, excessive alcohol use can lead to the development of chronic diseases and other series health problems. What is considered a "drink"? Excessive alcohol use includes:  · Binge Drinking: For women, 4 or more drinks consumed on one occasion. For men, 5 or more drinks consumed on one occasion. · Heavy Drinking: For women, 8 or more drinks per week. For men, 15 or more drinks per week  · Any alcohol used by pregnant women  · Any alcohol used by those under the age of 21 years    If you choose to drink, do so in moderation:  · Do not drink at all if you are under the age of 24, or if you are or may be pregnant, or have health problems that could be made worse by drinking.   · For women, up to 1 drink per day  · For men, up to 2 drinks a day    No one should begin drinking or drink more frequently based on potential health benefits    Short-Term Health Risks:  · Injuries: motor vehicle crashes, falls, drownings, burns  · Violence: homicide, suicide, sexual assault, intimate partner violence  · Alcohol poisoning  · Reproductive health: risky sexual behaviors, unintended prengnacy, sexually transmitted diseases, miscarriage, stillbirth, fetal alcohol syndrome    Long-Term Health Risks:  · Chronic diseases: high blood pressure, heart disease, stroke, liver disease, digestive problems  · Cancers: breast, mouth and throat, liver, colon  · Learning and memory problems: dementia, poor school performance  · Mental health: depression, anxiety, insomnia  · Social problems: lost productivity, family problems, unemployment  · Alcohol dependence    For support and more information:  · Substance Abuse and [facility] , [address] [place] [address]  Web Address: https://Leapset/    · Alcoholics Anonymous        Web Address: http://www.ideaTree - innovate | mentor | invest.[employer]/    https://www.cdc.gov/alcohol/fact-sheets/alcohol-use.htm     © Copyright [employer] 2018 Information is for End User's use only and may not be sold, redistributed or otherwise used for commercial purposes. All illustrations and images included in CareNotes® are the copyrighted property of [employer] or [facility] Preventive Visit Patient Instructions  Thank you for completing your Welcome to Medicare Visit or Medicare Annual Wellness Visit today. Your next wellness visit will be due in one year (9/26/2024). The screening/preventive services that you may require over the next 5-10 years are detailed below. Some tests may not apply to you based off risk factors and/or age. Screening tests ordered at today's visit but not completed yet may show as past due. Also, please note that scanned in results may not display below.   Preventive Screenings:  Service Recommendations Previous Testing/Comments   Colorectal Cancer Screening  · Colonoscopy    · Fecal Occult Blood Test (FOBT)/Fecal Immunochemical Test (FIT)  · Fecal DNA/Cologuard Test  · Flexible Sigmoidoscopy Age: 43-73 years old   Colonoscopy: every 10 years (May be performed more frequently if at higher risk)  OR  FOBT/FIT: every 1 year  OR  Cologuard: every 3 years  OR  Sigmoidoscopy: every 5 years  Screening may be recommended earlier than age 39 if at higher risk for colorectal cancer. Also, an individualized decision between you and your healthcare provider will decide whether screening between the ages of 77-80 would be appropriate. Colonoscopy: 11/01/2017  FOBT/FIT: Not on file  Cologuard: Not on file  Sigmoidoscopy: Not on file    Screening Current     Prostate Cancer Screening Individualized decision between patient and health care provider in men between ages of 53-66   Medicare will cover every 12 months beginning on the day after your 50th birthday PSA: 1.2 ng/mL     Screening Current     Hepatitis C Screening Once for adults born between [address] [place] [address]  More frequently in patients at high risk for Hepatitis C Hep C Antibody: Not on file        Diabetes Screening 1-2 times per year if you're at risk for diabetes or have pre-diabetes Fasting glucose: 101 mg/dL (1/12/2023)  A1C: No results in last 5 years (No results in last 5 years)  Screening Current   Cholesterol Screening Once every 5 years if you don't have a lipid disorder. May order more often based on risk factors. Lipid panel: 01/12/2023  Screening Not Indicated  History Lipid Disorder      Other Preventive Screenings Covered by Medicare:  16. Abdominal Aortic Aneurysm (AAA) Screening: covered once if your at risk. You're considered to be at risk if you have a family history of AAA or a male between the age of 70-76 who smoking at least 100 cigarettes in your lifetime.   17. Lung Cancer Screening: covers low dose CT scan once per year if you meet all of the following conditions: (1) Age 48-67; (2) No signs or symptoms of lung cancer; (3) Current smoker or have quit smoking within the last 15 years; (4) You have a tobacco smoking history of at least 20 pack years (packs per day x number of years you smoked); (5) You get a written order from a healthcare provider. 25. Glaucoma Screening: covered annually if you're considered high risk: (1) You have diabetes OR (2) Family history of glaucoma OR (3)  aged 48 and older OR (3)  American aged 72 and older  23. Osteoporosis Screening: covered every 2 years if you meet one of the following conditions: (1) Have a vertebral abnormality; (2) On glucocorticoid therapy for more than 3 months; (3) Have primary hyperparathyroidism; (4) On osteoporosis medications and need to assess response to drug therapy. 20. HIV Screening: covered annually if you're between the age of 14-79. Also covered annually if you are younger than 13 and older than 72 with risk factors for HIV infection. For pregnant patients, it is covered up to 3 times per pregnancy. Immunizations:  Immunization Recommendations   Influenza Vaccine Annual influenza vaccination during flu season is recommended for all persons aged >= 6 months who do not have contraindications   Pneumococcal Vaccine   * Pneumococcal conjugate vaccine = PCV13 (Prevnar 13), PCV15 (Vaxneuvance), PCV20 (Prevnar 20)  * Pneumococcal polysaccharide vaccine = PPSV23 (Pneumovax) Adults 20-63 years old: 1-3 doses may be recommended based on certain risk factors  Adults 72 years old: 1-2 doses may be recommended based off what pneumonia vaccine you previously received   Hepatitis B Vaccine 3 dose series if at intermediate or high risk (ex: diabetes, end stage renal disease, liver disease)   Tetanus (Td) Vaccine - COST NOT COVERED BY MEDICARE PART B Following completion of primary series, a booster dose should be given every 10 years to maintain immunity against tetanus.  Td may also be given as tetanus wound prophylaxis. Tdap Vaccine - COST NOT COVERED BY MEDICARE PART B Recommended at least once for all adults. For pregnant patients, recommended with each pregnancy. Shingles Vaccine (Shingrix) - COST NOT COVERED BY MEDICARE PART B  2 shot series recommended in those aged 48 and above     Health Maintenance Due:      Topic Date Due   • Hepatitis C Screening  Never done   • Colorectal Cancer Screening  11/01/2027     Immunizations Due:      Topic Date Due   • Pneumococcal Vaccine: 65+ Years (1 - PCV) Never done   • COVID-19 Vaccine (4 - Moderna series) 02/09/2022   • Influenza Vaccine (1) 09/01/2023     Advance Directives   What are advance directives? Advance directives are legal documents that state your wishes and plans for medical care. These plans are made ahead of time in case you lose your ability to make decisions for yourself. Advance directives can apply to any medical decision, such as the treatments you want, and if you want to donate organs. What are the types of advance directives? There are many types of advance directives, and each state has rules about how to use them. You may choose a combination of any of the following:  · Living will: This is a written record of the treatment you want. You can also choose which treatments you do not want, which to limit, and which to stop at a certain time. This includes surgery, medicine, IV fluid, and tube feedings. · Durable power of  for healthcare [place] [facility]): This is a written record that states who you want to make healthcare choices for you when you are unable to make them for yourself. This person, called a proxy, is usually a family member or a friend. You may choose more than 1 proxy. · Do not resuscitate (DNR) order:  A DNR order is used in case your heart stops beating or you stop breathing. It is a request not to have certain forms of treatment, such as CPR.  A DNR order may be included in other types of advance directives. · Medical directive: This covers the care that you want if you are in a coma, near death, or unable to make decisions for yourself. You can list the treatments you want for each condition. Treatment may include pain medicine, surgery, blood transfusions, dialysis, IV or tube feedings, and a ventilator (breathing machine). · Values history: This document has questions about your views, beliefs, and how you feel and think about life. This information can help others choose the care that you would choose. Why are advance directives important? An advance directive helps you control your care. Although spoken wishes may be used, it is better to have your wishes written down. Spoken wishes can be misunderstood, or not followed. Treatments may be given even if you do not want them. An advance directive may make it easier for your family to make difficult choices about your care. Fall Prevention    Fall prevention  includes ways to make your home and other areas safer. It also includes ways you can move more carefully to prevent a fall. Health conditions that cause changes in your blood pressure, vision, or muscle strength and coordination may increase your risk for falls. Medicines may also increase your risk for falls if they make you dizzy, weak, or sleepy. Fall prevention tips:   · Stand or sit up slowly. · Use assistive devices as directed. · Wear shoes that fit well and have soles that . · Wear a personal alarm. · Stay active. · Manage your medical conditions. Home Safety Tips:  · Add items to prevent falls in the bathroom. · Keep paths clear. · Install bright lights in your home. · Keep items you use often on shelves within reach. · Paint or place reflective tape on the edges of your stairs. Alcohol Use and Your Health    Drinking too much can harm your health.   Excessive alcohol use leads to about 88,000 death in the [facility] each year, and shortens the life of those who diet by almost 30 years. Further, excessive drinking cost the economy $249 billion in 2010. Most excessive drinkers are not alcohol dependent. Excessive alcohol use has immediate effects that increase the risk of many harmful health conditions. These are most often the result of binge drinking. Over time, excessive alcohol use can lead to the development of chronic diseases and other series health problems. What is considered a "drink"? Excessive alcohol use includes:  · Binge Drinking: For women, 4 or more drinks consumed on one occasion. For men, 5 or more drinks consumed on one occasion. · Heavy Drinking: For women, 8 or more drinks per week. For men, 15 or more drinks per week  · Any alcohol used by pregnant women  · Any alcohol used by those under the age of 21 years    If you choose to drink, do so in moderation:  · Do not drink at all if you are under the age of 24, or if you are or may be pregnant, or have health problems that could be made worse by drinking.   · For women, up to 1 drink per day  · For men, up to 2 drinks a day    No one should begin drinking or drink more frequently based on potential health benefits    Short-Term Health Risks:  · Injuries: motor vehicle crashes, falls, drownings, burns  · Violence: homicide, suicide, sexual assault, intimate partner violence  · Alcohol poisoning  · Reproductive health: risky sexual behaviors, unintended prengnacy, sexually transmitted diseases, miscarriage, stillbirth, fetal alcohol syndrome    Long-Term Health Risks:  · Chronic diseases: high blood pressure, heart disease, stroke, liver disease, digestive problems  · Cancers: breast, mouth and throat, liver, colon  · Learning and memory problems: dementia, poor school performance  · Mental health: depression, anxiety, insomnia  · Social problems: lost productivity, family problems, unemployment  · Alcohol dependence    For support and more information:  · Substance Abuse and [address]  Web Address: https://Alliqua/    · Alcoholics Anonymous        Web Address: http://www.arreola.info/    https://www.cdc.gov/alcohol/fact-sheets/alcohol-use.htm     © [employer] 2018 Information is for End User's use only and may not be sold, redistributed or otherwise used for commercial purposes. All illustrations and images included in CareNotes® are the copyrighted property of [employer] or [facility] Preventive Visit Patient Instructions  Thank you for completing your Welcome to Medicare Visit or Medicare Annual Wellness Visit today. Your next wellness visit will be due in one year (9/26/2024). The screening/preventive services that you may require over the next 5-10 years are detailed below. Some tests may not apply to you based off risk factors and/or age. Screening tests ordered at today's visit but not completed yet may show as past due. Also, please note that scanned in results may not display below. Preventive Screenings:  Service Recommendations Previous Testing/Comments   Colorectal Cancer Screening  · Colonoscopy    · Fecal Occult Blood Test (FOBT)/Fecal Immunochemical Test (FIT)  · Fecal DNA/Cologuard Test  · Flexible Sigmoidoscopy Age: 43-73 years old   Colonoscopy: every 10 years (May be performed more frequently if at higher risk)  OR  FOBT/FIT: every 1 year  OR  Cologuard: every 3 years  OR  Sigmoidoscopy: every 5 years  Screening may be recommended earlier than age 39 if at higher risk for colorectal cancer. Also, an individualized decision between you and your healthcare provider will decide whether screening between the ages of 77-80 would be appropriate.  Colonoscopy: 11/01/2017  FOBT/FIT: Not on file  Cologuard: Not on file  Sigmoidoscopy: Not on file    Screening Current     Prostate Cancer Screening Individualized decision between patient and health care provider in men between ages of 53-66   Medicare will cover every 12 months beginning on the day after your 50th birthday PSA: 1.2 ng/mL     Screening Current     Hepatitis C Screening Once for adults born between [address] [place] [address]  More frequently in patients at high risk for Hepatitis C Hep C Antibody: Not on file        Diabetes Screening 1-2 times per year if you're at risk for diabetes or have pre-diabetes Fasting glucose: 101 mg/dL (1/12/2023)  A1C: No results in last 5 years (No results in last 5 years)  Screening Current   Cholesterol Screening Once every 5 years if you don't have a lipid disorder. May order more often based on risk factors. Lipid panel: 01/12/2023  Screening Not Indicated  History Lipid Disorder      Other Preventive Screenings Covered by Medicare:  21. Abdominal Aortic Aneurysm (AAA) Screening: covered once if your at risk. You're considered to be at risk if you have a family history of AAA or a male between the age of 70-76 who smoking at least 100 cigarettes in your lifetime. 22. Lung Cancer Screening: covers low dose CT scan once per year if you meet all of the following conditions: (1) Age 48-67; (2) No signs or symptoms of lung cancer; (3) Current smoker or have quit smoking within the last 15 years; (4) You have a tobacco smoking history of at least 20 pack years (packs per day x number of years you smoked); (5) You get a written order from a healthcare provider. 21. Glaucoma Screening: covered annually if you're considered high risk: (1) You have diabetes OR (2) Family history of glaucoma OR (3)  aged 48 and older OR (3)  American aged 72 and older  25.  Osteoporosis Screening: covered every 2 years if you meet one of the following conditions: (1) Have a vertebral abnormality; (2) On glucocorticoid therapy for more than 3 months; (3) Have primary hyperparathyroidism; (4) On osteoporosis medications and need to assess response to drug therapy. 25. HIV Screening: covered annually if you're between the age of 14-79. Also covered annually if you are younger than 13 and older than 72 with risk factors for HIV infection. For pregnant patients, it is covered up to 3 times per pregnancy. Immunizations:  Immunization Recommendations   Influenza Vaccine Annual influenza vaccination during flu season is recommended for all persons aged >= 6 months who do not have contraindications   Pneumococcal Vaccine   * Pneumococcal conjugate vaccine = PCV13 (Prevnar 13), PCV15 (Vaxneuvance), PCV20 (Prevnar 20)  * Pneumococcal polysaccharide vaccine = PPSV23 (Pneumovax) Adults 20-63 years old: 1-3 doses may be recommended based on certain risk factors  Adults 72 years old: 1-2 doses may be recommended based off what pneumonia vaccine you previously received   Hepatitis B Vaccine 3 dose series if at intermediate or high risk (ex: diabetes, end stage renal disease, liver disease)   Tetanus (Td) Vaccine - COST NOT COVERED BY MEDICARE PART B Following completion of primary series, a booster dose should be given every 10 years to maintain immunity against tetanus. Td may also be given as tetanus wound prophylaxis. Tdap Vaccine - COST NOT COVERED BY MEDICARE PART B Recommended at least once for all adults. For pregnant patients, recommended with each pregnancy. Shingles Vaccine (Shingrix) - COST NOT COVERED BY MEDICARE PART B  2 shot series recommended in those aged 48 and above     Health Maintenance Due:      Topic Date Due   • Hepatitis C Screening  Never done   • Colorectal Cancer Screening  11/01/2027     Immunizations Due:      Topic Date Due   • Pneumococcal Vaccine: 65+ Years (1 - PCV) Never done   • COVID-19 Vaccine (4 - Moderna series) 02/09/2022   • Influenza Vaccine (1) 09/01/2023     Advance Directives   What are advance directives? Advance directives are legal documents that state your wishes and plans for medical care.  These plans are made ahead of time in case you lose your ability to make decisions for yourself. Advance directives can apply to any medical decision, such as the treatments you want, and if you want to donate organs. What are the types of advance directives? There are many types of advance directives, and each state has rules about how to use them. You may choose a combination of any of the following:  · Living will: This is a written record of the treatment you want. You can also choose which treatments you do not want, which to limit, and which to stop at a certain time. This includes surgery, medicine, IV fluid, and tube feedings. · Durable power of  for healthcare [facility]): This is a written record that states who you want to make healthcare choices for you when you are unable to make them for yourself. This person, called a proxy, is usually a family member or a friend. You may choose more than 1 proxy. · Do not resuscitate (DNR) order:  A DNR order is used in case your heart stops beating or you stop breathing. It is a request not to have certain forms of treatment, such as CPR. A DNR order may be included in other types of advance directives. · Medical directive: This covers the care that you want if you are in a coma, near death, or unable to make decisions for yourself. You can list the treatments you want for each condition. Treatment may include pain medicine, surgery, blood transfusions, dialysis, IV or tube feedings, and a ventilator (breathing machine). · Values history: This document has questions about your views, beliefs, and how you feel and think about life. This information can help others choose the care that you would choose. Why are advance directives important? An advance directive helps you control your care. Although spoken wishes may be used, it is better to have your wishes written down. Spoken wishes can be misunderstood, or not followed. Treatments may be given even if you do not want them.  An advance directive may make it easier for your family to make difficult choices about your care. Fall Prevention    Fall prevention  includes ways to make your home and other areas safer. It also includes ways you can move more carefully to prevent a fall. Health conditions that cause changes in your blood pressure, vision, or muscle strength and coordination may increase your risk for falls. Medicines may also increase your risk for falls if they make you dizzy, weak, or sleepy. Fall prevention tips:   · Stand or sit up slowly. · Use assistive devices as directed. · Wear shoes that fit well and have soles that . · Wear a personal alarm. · Stay active. · Manage your medical conditions. Home Safety Tips:  · Add items to prevent falls in the bathroom. · Keep paths clear. · Install bright lights in your home. · Keep items you use often on shelves within reach. · Paint or place reflective tape on the edges of your stairs. Alcohol Use and Your Health    Drinking too much can harm your health. Excessive alcohol use leads to about 88,000 death in the [facility] each year, and shortens the life of those who diet by almost 30 years. Further, excessive drinking cost the economy $249 billion in 2010. Most excessive drinkers are not alcohol dependent. Excessive alcohol use has immediate effects that increase the risk of many harmful health conditions. These are most often the result of binge drinking. Over time, excessive alcohol use can lead to the development of chronic diseases and other series health problems. What is considered a "drink"? Excessive alcohol use includes:  · Binge Drinking: For women, 4 or more drinks consumed on one occasion. For men, 5 or more drinks consumed on one occasion. · Heavy Drinking: For women, 8 or more drinks per week.  For men, 15 or more drinks per week  · Any alcohol used by pregnant women  · Any alcohol used by those under the age of 21 years    If you choose to drink, do so in moderation:  · Do not drink at all if you are under the age of 24, or if you are or may be pregnant, or have health problems that could be made worse by drinking. · For women, up to 1 drink per day  · For men, up to 2 drinks a day    No one should begin drinking or drink more frequently based on potential health benefits    Short-Term Health Risks:  · Injuries: motor vehicle crashes, falls, drownings, burns  · Violence: homicide, suicide, sexual assault, intimate partner violence  · Alcohol poisoning  · Reproductive health: risky sexual behaviors, unintended prengnacy, sexually transmitted diseases, miscarriage, stillbirth, fetal alcohol syndrome    Long-Term Health Risks:  · Chronic diseases: high blood pressure, heart disease, stroke, liver disease, digestive problems  · Cancers: breast, mouth and throat, liver, colon  · Learning and memory problems: dementia, poor school performance  · Mental health: depression, anxiety, insomnia  · Social problems: lost productivity, family problems, unemployment  · Alcohol dependence    For support and more information:  · Substance Abuse and [address]  Web Address: https://[employer]/    · Alcoholics Anonymous        Web Address: http://www.[employer]/    https://www.cdc.gov/alcohol/fact-sheets/alcohol-use.htm     © Copyright [employer] 2018 Information is for End User's use only and may not be sold, redistributed or otherwise used for commercial purposes.  All illustrations and images included in CareNotes® are the copyrighted property of [employer] Inc. or [address] [place] [address]

## 2023-09-26 NOTE — PROGRESS NOTES
Assessment and Plan:     Problem List Items Addressed This Visit    None  Visit Diagnoses     Medicare annual wellness visit, subsequent    -  Primary          Depression Screening and Follow-up Plan: Patient was screened for depression during today's encounter. They screened negative with a PHQ-2 score of 0. Preventive health issues were discussed with patient, and age appropriate screening tests were ordered as noted in patient's After Visit Summary. Personalized health advice and appropriate referrals for health education or preventive services given if needed, as noted in patient's After Visit Summary. History of Present Illness:     Patient presents for a Medicare Wellness Visit    [place]. He injured his back and had pain radiating down his right which she did go and see Ortho and went to physical therapy He has slowly improved but he is still left with numbness and discomfort in that leg and down to his foot. Kendrick Lennox He has only been treated with nonsteroidals. Patient Care Team:  Geraldine Madrigal MD as PCP - General (Family Medicine)     Review of Systems:     Review of Systems   Constitutional: Negative for chills, fatigue, fever and unexpected weight change. HENT: Negative for congestion, ear pain, hearing loss, postnasal drip, sinus pressure, sore throat, trouble swallowing and voice change. Eyes: Negative for visual disturbance. Respiratory: Negative for cough, chest tightness, shortness of breath and wheezing. Cardiovascular: Negative for chest pain, palpitations and leg swelling. Gastrointestinal: Negative for abdominal distention, abdominal pain, anal bleeding, blood in stool, constipation, diarrhea and nausea. Endocrine: Negative for cold intolerance, polydipsia, polyphagia and polyuria. Genitourinary: Negative for dysuria, flank pain, frequency, hematuria and urgency. Musculoskeletal: Positive for back pain. Negative for arthralgias, gait problem, joint swelling, myalgias and neck pain. Skin: Negative for rash. Allergic/Immunologic: Negative for immunocompromised state. Neurological: Positive for numbness (Starting at his SI joint on the right and going down toward his foot). Negative for dizziness, syncope, facial asymmetry, weakness, light-headedness and headaches. Hematological: Negative for adenopathy. Psychiatric/Behavioral: Negative for confusion, sleep disturbance and suicidal ideas.         Problem List:     Patient Active Problem List   Diagnosis   • H/O TIA (transient ischemic attack) and stroke   • Encounter for subsequent annual wellness visit (AWV) in Medicare patient   • Arthritis of carpometacarpal (CMC) joint of both thumbs   • Benign prostatic hyperplasia without lower urinary tract symptoms   • Carpal tunnel syndrome   • Diverticular disease   • Mixed hyperlipidemia   • Osteoarthritis of knee   • Plantar fascial fibromatosis   • Vitamin D deficiency      Past Medical and Surgical History:     Past Medical History:   Diagnosis Date   • H/O TIA (transient ischemic attack) and stroke     last assessed 04/25/17   • Skin lesion of face     last assessed 09/27/2017     Past Surgical History:   Procedure Laterality Date   • NO PAST SURGERIES     • TOOTH EXTRACTION        Family History:     Family History   Problem Relation Age of Onset   • Cancer Mother         lung, +smoker   • Heart disease Mother    • No Known Problems Father       Social History:     Social History     Socioeconomic History   • Marital status: /Civil Union     Spouse name: None   • Number of children: None   • Years of education: None   • Highest education level: None   Occupational History   • None   Tobacco Use   • Smoking status: Never   • Smokeless tobacco: Never   Vaping Use   • Vaping Use: Never used   Substance and Sexual Activity   • Alcohol use: Yes     Comment: daily wine   • Drug use: Never   • Sexual activity: None   Other Topics Concern   • None   Social History Narrative   • None     Social Determinants of Health     Financial Resource Strain: Unknown (9/21/2023)    Overall Financial Resource Strain (CARDIA)    • Difficulty of Paying Living Expenses: Patient refused   Food Insecurity: Not on file   Transportation Needs: Unknown (9/21/2023)    PRAPARE - Transportation    • Lack of Transportation (Medical): Patient refused    • Lack of Transportation (Non-Medical): Patient refused   Physical Activity: Not on file   Stress: Not on file   Social Connections: Not on file   Intimate Partner Violence: Not on file   Housing Stability: Not on file      Medications and Allergies:     Current Outpatient Medications   Medication Sig Dispense Refill   • ASPIRIN 81 PO Take 1 tablet by mouth daily     • atorvastatin (LIPITOR) 40 mg tablet Take 1.5 tablets (60 mg total) by mouth daily (Patient taking differently: Take 40 mg by mouth daily) 135 tablet 1   • Cholecalciferol (Vitamin D3) 50 MCG (2000 UT) TABS Take 2,000 Units by mouth daily     • Coenzyme Q10 (CO Q 10 PO) Take by mouth     • FIBER ADULT GUMMIES PO Take by mouth 4-5 g daily     • glucosamine-chondroitin 500-400 MG tablet Take 1 tablet by mouth 3 (three) times a day  0   • Multiple Vitamin (MULTIVITAMIN) tablet Take 1 tablet by mouth daily     • Omega-3 Fatty Acids (FISH OIL) 1000 MG CPDR Take by mouth 2 (two) times a day  0   • Red Yeast Rice Extract (RED YEAST RICE PO) Take by mouth     • TURMERIC PO Take by mouth       No current facility-administered medications for this visit.      Allergies   Allergen Reactions   • Bee Venom       Immunizations:     Immunization History   Administered Date(s) Administered   • COVID-19 MODERNA VACC 0.5 ML IM 02/10/2021, 03/16/2021, 12/15/2021   • Influenza Split High Dose Preservative Free IM 10/10/2018, 10/08/2019   • Tdap 01/15/2014   • Zoster 04/08/2015      Health Maintenance: Topic Date Due   • Hepatitis C Screening  Never done   • Colorectal Cancer Screening  11/01/2027         Topic Date Due   • Pneumococcal Vaccine: 65+ Years (1 - PCV) Never done   • COVID-19 Vaccine (4 - Moderna series) 02/09/2022   • Influenza Vaccine (1) 09/01/2023      Medicare Screening Tests and Risk Assessments:     Gustavo Brambila is here for his Subsequent Wellness visit. Last Medicare Wellness visit information reviewed, patient interviewed and updates made to the record today. Health Risk Assessment:   Patient rates overall health as very good. Patient feels that their physical health rating is slightly worse. Patient is very satisfied with their life. Eyesight was rated as same. Hearing was rated as same. Patient feels that their emotional and mental health rating is same. Patients states they are never, rarely angry. Patient states they are never, rarely unusually tired/fatigued. Pain experienced in the last 7 days has been some. Patient's pain rating has been 1/10. Patient states that he has experienced no weight loss or gain in last 6 months. Depression Screening:   PHQ-2 Score: 0      Fall Risk Screening: In the past year, patient has experienced: history of falling in past year    Injured during fall?: Yes    Feels unsteady when standing or walking?: No    Worried about falling?: Yes      Home Safety:  Patient does not have trouble with stairs inside or outside of their home. Patient has working smoke alarms and has working carbon monoxide detector. Home safety hazards include: none. Nutrition:   Current diet is Regular. Medications:   Patient is currently taking over-the-counter supplements. OTC medications include: Vitamins. Patient is able to manage medications. Activities of Daily Living (ADLs)/Instrumental Activities of Daily Living (IADLs):   Walk and transfer into and out of bed and chair?: Yes  Dress and groom yourself?: Yes    Bathe or shower yourself?: Yes    Feed yourself? Yes  Do your laundry/housekeeping?: Yes  Manage your money, pay your bills and track your expenses?: Yes  Make your own meals?: Yes    Do your own shopping?: Yes    Previous Hospitalizations:   Any hospitalizations or ED visits within the last 12 months?: No      Advance Care Planning:   Living will: Yes    Durable POA for healthcare: Yes    Advanced directive: Yes      Cognitive Screening:   Provider or family/friend/caregiver concerned regarding cognition?: No    PREVENTIVE SCREENINGS      Cardiovascular Screening:    General: Screening Not Indicated and History Lipid Disorder      Diabetes Screening:     General: Screening Current      Colorectal Cancer Screening:     General: Screening Current      Prostate Cancer Screening:    General: Screening Current      Osteoporosis Screening:    General: Screening Not Indicated      Abdominal Aortic Aneurysm (AAA) Screening:    Risk factors include: age between 70-75 yo        Lung Cancer Screening:     General: Screening Not Indicated      Hepatitis C Screening:    General: Screening Not Indicated    Screening, Brief Intervention, and Referral to Treatment (SBIRT)    Screening  Typical number of drinks in a day: 3  Typical number of drinks in a week: 21  Interpretation: Low risk drinking behavior. AUDIT-C Screenin) How often did you have a drink containing alcohol in the past year? 4 or more times a week  2) How many drinks did you have on a typical day when you were drinking in the past year? 3 to 4  3) How often did you have 6 or more drinks on one occasion in the past year? never    AUDIT-C Score: 4  Interpretation: Score 4-12 (male): POSITIVE screen for alcohol misuse    AUDIT Screenin) How often during the last year have you found that you were not able to stop drinking once you had started?  0 - never  5) How often during the last year have you failed to do what was normally expected from you because of drinking? 0 - never  6) How often during the last year have you needed a first drink in the morning to get yourself going after a heavy drinking session? 0 - never  7) How often during the last year have you had a feeling of guilt or remorse after drinking? 0 - never  8) How often during the last year have you been unable to remember what happened the night before because you had been drinking? 0 - never  9) Have you or someone else been injured as a result of your drinking? 0 - no  10) Has a relative or friend or a doctor or another health worker been concerned about your drinking or suggested you cut down? 0 - no    AUDIT Score: 4  Interpretation: Low risk alcohol consumption    Single Item Drug Screening:  How often have you used an illegal drug (including marijuana) or a prescription medication for non-medical reasons in the past year? never    Single Item Drug Screen Score: 0  Interpretation: Negative screen for possible drug use disorder    Other Counseling Topics:   Regular weightbearing exercise and calcium and vitamin D intake. No results found. Physical Exam:     /66 (BP Location: Left arm, Patient Position: Sitting, Cuff Size: Standard)   Pulse 65   Temp 97.6 °F (36.4 °C) (Temporal)   Resp 16   Ht 5' 7.75" (1.721 m)   Wt 70.8 kg (156 lb)   SpO2 97%   BMI 23.90 kg/m²     Physical Exam  Vitals and nursing note reviewed. Constitutional:       General: He is not in acute distress. Appearance: He is well-developed. HENT:      Head: Normocephalic and atraumatic. Right Ear: Tympanic membrane normal.      Left Ear: Tympanic membrane normal.      Nose: Nose normal.      Mouth/Throat:      Mouth: Mucous membranes are moist.   Eyes:      Extraocular Movements: Extraocular movements intact. Conjunctiva/sclera: Conjunctivae normal.      Pupils: Pupils are equal, round, and reactive to light. Neck:      Vascular: No carotid bruit. Cardiovascular:      Rate and Rhythm: Normal rate and regular rhythm.       Pulses: Normal pulses. Heart sounds: No murmur heard. Pulmonary:      Effort: Pulmonary effort is normal. No respiratory distress. Breath sounds: Normal breath sounds. Abdominal:      General: Abdomen is flat. Palpations: Abdomen is soft. Tenderness: There is no abdominal tenderness. Musculoskeletal:         General: No swelling or deformity. Normal range of motion. Cervical back: Neck supple. Right lower leg: No edema. Left lower leg: No edema. Lymphadenopathy:      Cervical: No cervical adenopathy. Skin:     General: Skin is warm and dry. Capillary Refill: Capillary refill takes less than 2 seconds. Findings: No rash. Neurological:      General: No focal deficit present. Mental Status: He is alert and oriented to person, place, and time. Mental status is at baseline. Sensory: Sensory deficit (Mild decrease sensation or abnormality of the right sciatica area) present. Motor: No weakness. Gait: Gait normal.      Deep Tendon Reflexes: Reflexes normal.   Psychiatric:         Mood and Affect: Mood normal.         Behavior: Behavior normal.         Thought Content:  Thought content normal.         Judgment: Judgment normal.          Carlos Chi MD

## 2023-11-20 DIAGNOSIS — E78.5 DYSLIPIDEMIA: ICD-10-CM

## 2023-11-20 RX ORDER — ATORVASTATIN CALCIUM 40 MG/1
60 TABLET, FILM COATED ORAL DAILY
Qty: 135 TABLET | Refills: 1 | Status: SHIPPED | OUTPATIENT
Start: 2023-11-20

## 2023-11-25 PROBLEM — Z00.00 MEDICARE ANNUAL WELLNESS VISIT, SUBSEQUENT: Status: RESOLVED | Noted: 2022-05-10 | Resolved: 2023-11-25

## 2023-12-04 DIAGNOSIS — E78.5 DYSLIPIDEMIA: ICD-10-CM

## 2023-12-04 RX ORDER — ATORVASTATIN CALCIUM 40 MG/1
60 TABLET, FILM COATED ORAL DAILY
Qty: 135 TABLET | Refills: 0 | Status: SHIPPED | OUTPATIENT
Start: 2023-12-04

## 2023-12-04 NOTE — TELEPHONE ENCOUNTER
Patient called medication refill line in regards to his Atorvastatin 40mg    He is not taking this once daily and the pharmacy needs a script with the update directions.     Please review and advise

## 2023-12-11 ENCOUNTER — TELEPHONE (OUTPATIENT)
Age: 68
End: 2023-12-11

## 2023-12-11 NOTE — TELEPHONE ENCOUNTER
Patient is calling in regards to his Atorvastatin prescription, states that it keeps being sent over for one and one half tablet daily when patient is only taking on tablet per day. (40mg) He's asking if a new updated script can be sent over reflecting the correct dosage.  [employer]

## 2023-12-12 DIAGNOSIS — E78.5 DYSLIPIDEMIA: ICD-10-CM

## 2023-12-12 RX ORDER — ATORVASTATIN CALCIUM 40 MG/1
40 TABLET, FILM COATED ORAL DAILY
Qty: 90 TABLET | Refills: 1 | Status: SHIPPED | OUTPATIENT
Start: 2023-12-12

## 2024-03-06 ENCOUNTER — EVALUATION (OUTPATIENT)
Dept: PHYSICAL THERAPY | Age: 69
End: 2024-03-06
Payer: MEDICARE

## 2024-03-06 DIAGNOSIS — M48.061 SPINAL STENOSIS OF LUMBAR REGION, UNSPECIFIED WHETHER NEUROGENIC CLAUDICATION PRESENT: Primary | ICD-10-CM

## 2024-03-06 PROCEDURE — 97161 PT EVAL LOW COMPLEX 20 MIN: CPT | Performed by: PHYSICAL THERAPIST

## 2024-03-06 NOTE — PROGRESS NOTES
PT Evaluation     Today's date: 3/6/2024  Patient name: Eagle Ho  : 1955  MRN: [id number]  Referring provider: Alex Hawkins MD  Dx:   Encounter Diagnosis     ICD-10-CM    1. Spinal stenosis of lumbar region, unspecified whether neurogenic claudication present  M48.061                      Assessment  Assessment details: Eagle Ho is a 68 y.o. male who presents with decreased strength and decreased ROM.  Due to these impairments, patient has difficulty performing a/iadls, recreational activities, and work-related activities.  Patient's clinical presentation is consistent with their referring diagnosis of spinal stenosis of lumbar region; patient did not demonstrate a direction preference of the L/S upon initial evaluation.  Patient would benefit from skilled physical therapy to address their aforementioned impairments, improve their level of function and to improve their overall quality of life.  Patient states he wishes to attend PT one to two times per week secondary to scheduling conflicts.   Impairments: abnormal or restricted ROM, impaired physical strength and lacks appropriate home exercise program  Understanding of Dx/Px/POC: good   Prognosis: good    Goals  Short term goals - to be achieved in 4 weeks:    Increase strength by 1/2 grade.   Improve L/S ROM by 25%.  Long term goals - to be achieved by discharge:    Performance in related work activities is improved to maximal level of function.    IADL performance in related activities is improved to maximal level of function.    Patient will be able to ambulate on uneven surfaces without loss of stability.     Plan  Planned modality interventions: traction  Planned therapy interventions: manual therapy, neuromuscular re-education, patient education, postural training, strengthening, stretching, therapeutic activities, therapeutic exercise and home exercise program  Frequency: 1-2 times per week.  Duration in weeks: 6  Plan of Care  beginning date: 3/6/2024  Plan of Care expiration date: 2024  Treatment plan discussed with: patient        Subjective Evaluation    History of Present Illness  Mechanism of injury: Patient refers to PT with c/o numbness in his right lateral lower leg and into his right foot which began in 2023 after performing heavy lifting while putting in a  patio.  Patient also c/o weakness in his right ankle; patient states his right ankle rolls when ambulating on an uneven surface.  Patient received an MRI of his L/S on 2/15/24 which revealed multilevel degenerative changes of the lumbar spine with degenerative endplate changes at L3-4 through L5-S1; left-sided disc osteophyte complex at L3-4 causing severe left neural foraminal stenosis; broad-based disc bulge at L4-5 causing severe bilateral neural foraminal stenosis.  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     Pain  Current pain ratin  At best pain ratin  At worst pain ratin        Objective     Neurological Testing     Sensation     Lumbar   Left   Intact: light touch    Right   Diminished: light touch    Comments   Right light touch: Decreased light touch L5    Active Range of Motion     Lumbar   Flexion:  Restriction level: minimal  Extension:  Restriction level: minimal  Left lateral flexion:  Restriction level: minimal  Right lateral flexion:  Restriction level: minimal    Strength/Myotome Testing     Left Hip   Planes of Motion   Flexion: 5    Right Hip   Planes of Motion   Flexion: 5    Left Knee   Flexion: 5  Extension: 5    Right Knee   Flexion: 5  Extension: 5    Left Ankle/Foot   Dorsiflexion: 5  Plantar flexion: 5  Inversion: 5  Eversion: 5    Right Ankle/Foot   Dorsiflexion: 4+  Plantar flexion: 4+  Inversion: 4  Eversion: 4-    General Comments:      Lumbar Comments  Repeated flexion in standing: No effect  Repeated extension in standing: No effect   Repeated left lateral flexion: No  effect  Repeated right lateral flexion: No effect  Repeated flexion in lying: No effect  Repeated extension in lying: No effect             Precautions: TIA - 2019      Manuals 3/6                                                                Neuro Re-Ed             Sup TA activation             Sup TA with marches             Sup TA with alt UE/LE             Bridges             Planks             Side Planks                          Ther Ex             Bike or TM             TB rows             TB lat pull downs             TB Multifidus press             Mini squats with TA activ.             R ankle TB x 4             Stand HR/TR                          Ther Activity                                       Gait Training                                       Modalities             Trial mechanical L/S traction NV

## 2024-03-06 NOTE — LETTER
2024    Alex Hawkins MD  [address] [place] [id number]    Patient: Eagle Ho   YOB: 1955   Date of Visit: 3/6/2024     Encounter Diagnosis     ICD-10-CM    1. Spinal stenosis of lumbar region, unspecified whether neurogenic claudication present  M48.061           Dear Dr. Hawkins:    Thank you for your recent referral of Eagle Ho. Please review the attached evaluation summary from Eagle's recent visit.     Please verify that you agree with the plan of care by signing the attached order.     If you have any questions or concerns, please do not hesitate to call.     I sincerely appreciate the opportunity to share in the care of one of your patients and hope to have another opportunity to work with you in the near future.       Sincerely,    Dain Rios, PT      Referring Provider:      I certify that I have read the below Plan of Care and certify the need for these services furnished under this plan of treatment while under my care.                    Alex Hawkins MD  [address] [place] [id number]  Via Fax: [phone]          PT Evaluation     Today's date: 3/6/2024  Patient name: Eagle Ho  : 1955  MRN: [id number]  Referring provider: Alex Hawkins MD  Dx:   Encounter Diagnosis     ICD-10-CM    1. Spinal stenosis of lumbar region, unspecified whether neurogenic claudication present  M48.061                      Assessment  Assessment details: Eagle Ho is a 68 y.o. male who presents with decreased strength and decreased ROM.  Due to these impairments, patient has difficulty performing a/iadls, recreational activities, and work-related activities.  Patient's clinical presentation is consistent with their referring diagnosis of spinal stenosis of lumbar region; patient did not demonstrate a direction preference of the L/S upon initial evaluation.  Patient would benefit from skilled physical therapy to address their  aforementioned impairments, improve their level of function and to improve their overall quality of life.  Patient states he wishes to attend PT one to two times per week secondary to scheduling conflicts.   Impairments: abnormal or restricted ROM, impaired physical strength and lacks appropriate home exercise program  Understanding of Dx/Px/POC: good   Prognosis: good    Goals  Short term goals - to be achieved in 4 weeks:    Increase strength by 1/2 grade.   Improve L/S ROM by 25%.  Long term goals - to be achieved by discharge:    Performance in related work activities is improved to maximal level of function.    IADL performance in related activities is improved to maximal level of function.    Patient will be able to ambulate on uneven surfaces without loss of stability.     Plan  Planned modality interventions: traction  Planned therapy interventions: manual therapy, neuromuscular re-education, patient education, postural training, strengthening, stretching, therapeutic activities, therapeutic exercise and home exercise program  Frequency: 1-2 times per week.  Duration in weeks: 6  Plan of Care beginning date: 3/6/2024  Plan of Care expiration date: 4/19/2024  Treatment plan discussed with: patient        Subjective Evaluation    History of Present Illness  Mechanism of injury: Patient refers to PT with c/o numbness in his right lateral lower leg and into his right foot which began in 2/2023 after performing heavy lifting while putting in a  patio.  Patient also c/o weakness in his right ankle; patient states his right ankle rolls when ambulating on an uneven surface.  Patient received an MRI of his L/S on 2/15/24 which revealed multilevel degenerative changes of the lumbar spine with degenerative endplate changes at L3-4 through L5-S1; left-sided disc osteophyte complex at L3-4 causing severe left neural foraminal stenosis; broad-based disc bulge at L4-5 causing severe bilateral neural foraminal  stenosis.  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     Pain  Current pain ratin  At best pain ratin  At worst pain ratin        Objective     Neurological Testing     Sensation     Lumbar   Left   Intact: light touch    Right   Diminished: light touch    Comments   Right light touch: Decreased light touch L5    Active Range of Motion     Lumbar   Flexion:  Restriction level: minimal  Extension:  Restriction level: minimal  Left lateral flexion:  Restriction level: minimal  Right lateral flexion:  Restriction level: minimal    Strength/Myotome Testing     Left Hip   Planes of Motion   Flexion: 5    Right Hip   Planes of Motion   Flexion: 5    Left Knee   Flexion: 5  Extension: 5    Right Knee   Flexion: 5  Extension: 5    Left Ankle/Foot   Dorsiflexion: 5  Plantar flexion: 5  Inversion: 5  Eversion: 5    Right Ankle/Foot   Dorsiflexion: 4+  Plantar flexion: 4+  Inversion: 4  Eversion: 4-    General Comments:      Lumbar Comments  Repeated flexion in standing: No effect  Repeated extension in standing: No effect   Repeated left lateral flexion: No effect  Repeated right lateral flexion: No effect  Repeated flexion in lying: No effect  Repeated extension in lying: No effect             Precautions: TIA - 2019      Manuals 3/6                                                                Neuro Re-Ed             Sup TA activation             Sup TA with marches             Sup TA with alt UE/LE             Bridges             Planks             Side Planks                          Ther Ex             Bike or TM             TB rows             TB lat pull downs             TB Multifidus press             Mini squats with TA activ.             R ankle TB x 4             Stand HR/TR                          Ther Activity                                       Gait Training                                       Modalities             Trial mechanical L/S traction NV

## 2024-03-08 ENCOUNTER — OFFICE VISIT (OUTPATIENT)
Dept: PHYSICAL THERAPY | Age: 69
End: 2024-03-08
Payer: MEDICARE

## 2024-03-08 DIAGNOSIS — M48.061 SPINAL STENOSIS OF LUMBAR REGION, UNSPECIFIED WHETHER NEUROGENIC CLAUDICATION PRESENT: Primary | ICD-10-CM

## 2024-03-08 PROCEDURE — 97110 THERAPEUTIC EXERCISES: CPT

## 2024-03-08 PROCEDURE — 97112 NEUROMUSCULAR REEDUCATION: CPT

## 2024-03-08 PROCEDURE — 97012 MECHANICAL TRACTION THERAPY: CPT

## 2024-03-08 NOTE — PROGRESS NOTES
"Daily Note     Today's date: 3/8/2024  Patient name: Eagle Ho  : 1955  MRN: [id number]  Referring provider: Alex Hawkins MD  Dx:   Encounter Diagnosis     ICD-10-CM    1. Spinal stenosis of lumbar region, unspecified whether neurogenic claudication present  M48.061                      Subjective: Pt reports that he is having radiating symptoms in his RLE that are constant form the back of his knee to his R foot. Denies any back pain at the start of session today.       Objective: See treatment diary below      Assessment: Tolerated treatment fair. Began on the bike to start session as an active warmup followed by lumbar mechanical traction. No real change following traction. Added in TrA strengthening and stability this session. Will f/u with sx's nv. Updated pts HEP. Patient demonstrated fatigue post treatment, exhibited good technique with therapeutic exercises, and would benefit from continued PT      Plan: Continue per plan of care.      Precautions: TIA - 2019      Manuals 3/6 3/8                                                               Neuro Re-Ed             Sup TA activation  5\"x15           Sup TA with marches  20x ea           Sup TA with alt UE/LE  20x ea           Bridges  5\" 2x10           Planks  3x30\"           Side Planks                          Ther Ex             Bike or TM  Bike 5' L1           TB rows             TB lat pull downs             TB Multifidus press  GTB 15x           Mini squats with TA activ.             R ankle TB x 4             Stand HR/TR                          Ther Activity                                       Gait Training                                       Modalities             Trial mechanical L/S traction NV Inter 60-45# 10' 2 step 70#?                            "

## 2024-03-22 ENCOUNTER — APPOINTMENT (OUTPATIENT)
Dept: PHYSICAL THERAPY | Age: 69
End: 2024-03-22
Payer: MEDICARE

## 2024-03-25 ENCOUNTER — OFFICE VISIT (OUTPATIENT)
Dept: PHYSICAL THERAPY | Age: 69
End: 2024-03-25
Payer: MEDICARE

## 2024-03-25 DIAGNOSIS — M48.061 SPINAL STENOSIS OF LUMBAR REGION, UNSPECIFIED WHETHER NEUROGENIC CLAUDICATION PRESENT: Primary | ICD-10-CM

## 2024-03-25 PROCEDURE — 97530 THERAPEUTIC ACTIVITIES: CPT

## 2024-03-25 PROCEDURE — 97112 NEUROMUSCULAR REEDUCATION: CPT

## 2024-03-25 NOTE — PROGRESS NOTES
"Daily Note     Today's date: 3/25/2024  Patient name: Eagle Ho  : 1955  MRN: [id number]  Referring provider: Alex Hawkins MD  Dx:   Encounter Diagnosis     ICD-10-CM    1. Spinal stenosis of lumbar region, unspecified whether neurogenic claudication present  M48.061           Start Time: 0842  Stop Time: 0930  Total time in clinic (min): 48 minutes    Subjective: Pt reports he was busy working.  Numbness in foot noted. Trouble skiing.   Notes acute event about a year ago. Gradually better.  Is able to chop wood, do all kinds of activities.       Objective: See treatment diary below    Assessment: Tolerated treatment fair. Pt demonstrates pain free flexion and extension in spine. Symptoms not provoked with movements.  Difficulty controlling single leg and impaired proprioception noted.  Functional activities mimicking ski demands were helpful. Pt educated on improving/compensating for somatosensory deficits in context of functional activities.    Updated pts HEP. Patient demonstrated fatigue post treatment, exhibited good technique with therapeutic exercises, and would benefit from continued PT      Plan: Continue per plan of care.      Precautions: TIA - 2019      Manuals 3/6 3/8                                                               Neuro Re-Ed             Sup TA activation  5\"x15           Sup TA with marches  20x ea           Sup TA with alt UE/LE  20x ea           Bridges  5\" 2x10           Planks  3x30\"           Side Planks             Single leg/EC   10\"  x10          Tandem/foam   10\"x10  EC/EO prn          Single leg/catch   2x15          Bosu squat   NV                                    Ther Ex             Bike or TM  Bike 5' L1           TB rows             TB lat pull downs             TB Multifidus press  GTB 15x           Mini squats with TA activ.             R ankle TB x 4             Stand HR/TR                          Squat   NV          Deadlift/Box lift   NV        "   Suitcase carry   NV                                    Ther Activity             Lateral lunge   PMB  3 x 10          Reverse lunge   UE assist  3x10          Squat   3x10                                    Gait Training                                       Modalities             Trial mechanical L/S traction NV Inter 60-45# 10' 2 step NP

## 2024-04-03 ENCOUNTER — OFFICE VISIT (OUTPATIENT)
Dept: FAMILY MEDICINE CLINIC | Facility: CLINIC | Age: 69
End: 2024-04-03
Payer: MEDICARE

## 2024-04-03 VITALS
OXYGEN SATURATION: 99 % | BODY MASS INDEX: 24.89 KG/M2 | DIASTOLIC BLOOD PRESSURE: 68 MMHG | HEIGHT: 68 IN | TEMPERATURE: 97.2 F | HEART RATE: 58 BPM | WEIGHT: 164.2 LBS | SYSTOLIC BLOOD PRESSURE: 122 MMHG

## 2024-04-03 DIAGNOSIS — E78.5 DYSLIPIDEMIA: Primary | ICD-10-CM

## 2024-04-03 DIAGNOSIS — E78.2 MIXED HYPERLIPIDEMIA: ICD-10-CM

## 2024-04-03 DIAGNOSIS — E55.9 VITAMIN D DEFICIENCY: ICD-10-CM

## 2024-04-03 DIAGNOSIS — E78.5 DYSLIPIDEMIA: ICD-10-CM

## 2024-04-03 DIAGNOSIS — Z12.5 SCREENING FOR MALIGNANT NEOPLASM OF PROSTATE: ICD-10-CM

## 2024-04-03 DIAGNOSIS — M54.31 SCIATICA OF RIGHT SIDE: ICD-10-CM

## 2024-04-03 PROCEDURE — 99214 OFFICE O/P EST MOD 30 MIN: CPT | Performed by: FAMILY MEDICINE

## 2024-04-03 PROCEDURE — G2211 COMPLEX E/M VISIT ADD ON: HCPCS | Performed by: FAMILY MEDICINE

## 2024-04-03 NOTE — PROGRESS NOTES
Assessment/Plan:    1. Dyslipidemia    2. Mixed hyperlipidemia  -     CBC and differential; Future  -     Comprehensive metabolic panel; Future  -     Lipid panel; Future    3. Vitamin D deficiency  -     Vitamin D 25 hydroxy; Future    4. Screening for malignant neoplasm of prostate  -     PSA, Total Screen; Future    5. Sciatica of right side    6. Dyslipidemia  Comments:  on statin        There are no Patient Instructions on file for this visit.    No follow-ups on file.    Subjective:      Patient ID: Eagle Ho is a 68 y.o. male.    Chief Complaint   Patient presents with    Follow-up       Here for f/u, following with [facility] for numbness down the RLE lower leg into the foot. Starting with PT, has had 3 sessions so far. MRI done in 2/24 reveals severe foraminal stenosis on L3-4 and disc bulging on L4-5. Has been taking relief factor which has helped the arthritis but not necessarily his back pain. Due for labs, lipids have been controlled with statin. PT seems to be helping, hasn't had any falls in the past month, whereas he was losing his balance twice a day prior.         The following portions of the patient's history were reviewed and updated as appropriate: allergies, current medications, past family history, past medical history, past social history, past surgical history and problem list.    Review of Systems   Constitutional:  Negative for fatigue and fever.   HENT:  Negative for congestion.    Eyes:  Negative for visual disturbance.   Respiratory:  Negative for chest tightness and shortness of breath.    Cardiovascular:  Negative for chest pain and palpitations.   Gastrointestinal:  Negative for abdominal pain.   Genitourinary:  Negative for difficulty urinating.   Musculoskeletal:  Negative for arthralgias.   Neurological:  Negative for headaches.   Hematological:  Does not bruise/bleed easily.         Current Outpatient Medications   Medication Sig Dispense Refill    ASPIRIN 81 PO Take 1 tablet by  "mouth daily      atorvastatin (LIPITOR) 40 mg tablet Take 1 tablet (40 mg total) by mouth daily 90 tablet 1    Cholecalciferol (Vitamin D3) 50 MCG (2000 UT) TABS Take 2,000 Units by mouth daily      Coenzyme Q10 (CO Q 10 PO) Take by mouth      FIBER ADULT GUMMIES PO Take by mouth 4-5 g daily      glucosamine-chondroitin 500-400 MG tablet Take 1 tablet by mouth 3 (three) times a day  0    Multiple Vitamin (MULTIVITAMIN) tablet Take 1 tablet by mouth daily      Omega-3 Fatty Acids (FISH OIL) 1000 MG CPDR Take by mouth 2 (two) times a day  0    TURMERIC PO Take by mouth       No current facility-administered medications for this visit.       Objective:    /68 (BP Location: Right arm, Patient Position: Sitting, Cuff Size: Standard)   Pulse 58   Temp (!) 97.2 °F (36.2 °C) (Temporal)   Ht 5' 7.75\" (1.721 m)   Wt 74.5 kg (164 lb 3.2 oz)   SpO2 99%   BMI 25.15 kg/m²        Physical Exam  Vitals reviewed.   Constitutional:       Appearance: Normal appearance. He is well-developed.   Cardiovascular:      Rate and Rhythm: Normal rate and regular rhythm.      Heart sounds: Normal heart sounds.   Pulmonary:      Effort: Pulmonary effort is normal.      Breath sounds: Normal breath sounds.   Musculoskeletal:      Right lower leg: No edema.      Left lower leg: No edema.   Skin:     General: Skin is warm.   Neurological:      General: No focal deficit present.      Mental Status: He is alert and oriented to person, place, and time.   Psychiatric:         Mood and Affect: Mood normal.         Behavior: Behavior normal.         Thought Content: Thought content normal.         Judgment: Judgment normal.                Katina Yeh MD  "

## 2024-04-11 ENCOUNTER — APPOINTMENT (OUTPATIENT)
Dept: LAB | Age: 69
End: 2024-04-11
Payer: MEDICARE

## 2024-04-11 DIAGNOSIS — Z12.5 SCREENING FOR MALIGNANT NEOPLASM OF PROSTATE: ICD-10-CM

## 2024-04-11 DIAGNOSIS — E78.2 MIXED HYPERLIPIDEMIA: ICD-10-CM

## 2024-04-11 DIAGNOSIS — E55.9 VITAMIN D DEFICIENCY: ICD-10-CM

## 2024-04-11 LAB
25(OH)D3 SERPL-MCNC: 62.6 NG/ML (ref 30–100)
ALBUMIN SERPL BCP-MCNC: 4.5 G/DL (ref 3.5–5)
ALP SERPL-CCNC: 60 U/L (ref 34–104)
ALT SERPL W P-5'-P-CCNC: 18 U/L (ref 7–52)
ANION GAP SERPL CALCULATED.3IONS-SCNC: 5 MMOL/L (ref 4–13)
AST SERPL W P-5'-P-CCNC: 20 U/L (ref 13–39)
BASOPHILS # BLD AUTO: 0.03 THOUSANDS/ÂΜL (ref 0–0.1)
BASOPHILS NFR BLD AUTO: 1 % (ref 0–1)
BILIRUB SERPL-MCNC: 0.83 MG/DL (ref 0.2–1)
BUN SERPL-MCNC: 14 MG/DL (ref 5–25)
CALCIUM SERPL-MCNC: 9.1 MG/DL (ref 8.4–10.2)
CHLORIDE SERPL-SCNC: 106 MMOL/L (ref 96–108)
CHOLEST SERPL-MCNC: 153 MG/DL
CO2 SERPL-SCNC: 29 MMOL/L (ref 21–32)
CREAT SERPL-MCNC: 0.98 MG/DL (ref 0.6–1.3)
EOSINOPHIL # BLD AUTO: 0.17 THOUSAND/ÂΜL (ref 0–0.61)
EOSINOPHIL NFR BLD AUTO: 3 % (ref 0–6)
ERYTHROCYTE [DISTWIDTH] IN BLOOD BY AUTOMATED COUNT: 13.7 % (ref 11.6–15.1)
GFR SERPL CREATININE-BSD FRML MDRD: 77 ML/MIN/1.73SQ M
GLUCOSE P FAST SERPL-MCNC: 89 MG/DL (ref 65–99)
HCT VFR BLD AUTO: 46.4 % (ref 36.5–49.3)
HDLC SERPL-MCNC: 78 MG/DL
HGB BLD-MCNC: 15.2 G/DL (ref 12–17)
IMM GRANULOCYTES # BLD AUTO: 0.02 THOUSAND/UL (ref 0–0.2)
IMM GRANULOCYTES NFR BLD AUTO: 0 % (ref 0–2)
LDLC SERPL CALC-MCNC: 64 MG/DL (ref 0–100)
LYMPHOCYTES # BLD AUTO: 2.27 THOUSANDS/ÂΜL (ref 0.6–4.47)
LYMPHOCYTES NFR BLD AUTO: 45 % (ref 14–44)
MCH RBC QN AUTO: 30 PG (ref 26.8–34.3)
MCHC RBC AUTO-ENTMCNC: 32.8 G/DL (ref 31.4–37.4)
MCV RBC AUTO: 92 FL (ref 82–98)
MONOCYTES # BLD AUTO: 0.38 THOUSAND/ÂΜL (ref 0.17–1.22)
MONOCYTES NFR BLD AUTO: 8 % (ref 4–12)
NEUTROPHILS # BLD AUTO: 2.16 THOUSANDS/ÂΜL (ref 1.85–7.62)
NEUTS SEG NFR BLD AUTO: 43 % (ref 43–75)
NONHDLC SERPL-MCNC: 75 MG/DL
NRBC BLD AUTO-RTO: 0 /100 WBCS
PLATELET # BLD AUTO: 280 THOUSANDS/UL (ref 149–390)
PMV BLD AUTO: 10.2 FL (ref 8.9–12.7)
POTASSIUM SERPL-SCNC: 4.6 MMOL/L (ref 3.5–5.3)
PROT SERPL-MCNC: 7.3 G/DL (ref 6.4–8.4)
PSA SERPL-MCNC: 1.81 NG/ML (ref 0–4)
RBC # BLD AUTO: 5.07 MILLION/UL (ref 3.88–5.62)
SODIUM SERPL-SCNC: 140 MMOL/L (ref 135–147)
TRIGL SERPL-MCNC: 53 MG/DL
WBC # BLD AUTO: 5.03 THOUSAND/UL (ref 4.31–10.16)

## 2024-04-11 PROCEDURE — 80061 LIPID PANEL: CPT

## 2024-04-11 PROCEDURE — 82306 VITAMIN D 25 HYDROXY: CPT

## 2024-04-11 PROCEDURE — G0103 PSA SCREENING: HCPCS

## 2024-04-11 PROCEDURE — 36415 COLL VENOUS BLD VENIPUNCTURE: CPT

## 2024-04-11 PROCEDURE — 85025 COMPLETE CBC W/AUTO DIFF WBC: CPT

## 2024-04-11 PROCEDURE — 80053 COMPREHEN METABOLIC PANEL: CPT

## 2024-04-16 ENCOUNTER — APPOINTMENT (OUTPATIENT)
Dept: LAB | Age: 69
End: 2024-04-16
Payer: MEDICARE

## 2024-04-16 DIAGNOSIS — G60.3 IDIOPATHIC PROGRESSIVE POLYNEUROPATHY: ICD-10-CM

## 2024-04-16 DIAGNOSIS — I51.9 MYXEDEMA HEART DISEASE: ICD-10-CM

## 2024-04-16 DIAGNOSIS — R73.03 DIABETES MELLITUS, LATENT: ICD-10-CM

## 2024-04-16 DIAGNOSIS — E53.8 BIOTIN-(PROPIONYL-COA-CARBOXYLASE) LIGASE DEFICIENCY: ICD-10-CM

## 2024-04-16 DIAGNOSIS — E03.9 MYXEDEMA HEART DISEASE: ICD-10-CM

## 2024-04-16 LAB
EST. AVERAGE GLUCOSE BLD GHB EST-MCNC: 111 MG/DL
HBA1C MFR BLD: 5.5 %
TSH SERPL DL<=0.05 MIU/L-ACNC: 2.85 UIU/ML (ref 0.45–4.5)
VIT B12 SERPL-MCNC: 193 PG/ML (ref 180–914)

## 2024-04-16 PROCEDURE — 84443 ASSAY THYROID STIM HORMONE: CPT

## 2024-04-16 PROCEDURE — 36415 COLL VENOUS BLD VENIPUNCTURE: CPT

## 2024-04-16 PROCEDURE — 84165 PROTEIN E-PHORESIS SERUM: CPT

## 2024-04-16 PROCEDURE — 82607 VITAMIN B-12: CPT

## 2024-04-16 PROCEDURE — 83036 HEMOGLOBIN GLYCOSYLATED A1C: CPT

## 2024-04-17 LAB
ALBUMIN SERPL ELPH-MCNC: 4.29 G/DL (ref 3.2–5.1)
ALBUMIN SERPL ELPH-MCNC: 63.1 % (ref 48–70)
ALPHA1 GLOB SERPL ELPH-MCNC: 0.22 G/DL (ref 0.15–0.47)
ALPHA1 GLOB SERPL ELPH-MCNC: 3.2 % (ref 1.8–7)
ALPHA2 GLOB SERPL ELPH-MCNC: 0.52 G/DL (ref 0.42–1.04)
ALPHA2 GLOB SERPL ELPH-MCNC: 7.6 % (ref 5.9–14.9)
BETA GLOB ABNORMAL SERPL ELPH-MCNC: 0.45 G/DL (ref 0.31–0.57)
BETA1 GLOB SERPL ELPH-MCNC: 6.6 % (ref 4.7–7.7)
BETA2 GLOB SERPL ELPH-MCNC: 5.8 % (ref 3.1–7.9)
BETA2+GAMMA GLOB SERPL ELPH-MCNC: 0.39 G/DL (ref 0.2–0.58)
GAMMA GLOB ABNORMAL SERPL ELPH-MCNC: 0.93 G/DL (ref 0.4–1.66)
GAMMA GLOB SERPL ELPH-MCNC: 13.7 % (ref 6.9–22.3)
IGG/ALB SER: 1.71 {RATIO} (ref 1.1–1.8)
PROT PATTERN SERPL ELPH-IMP: NORMAL
PROT SERPL-MCNC: 6.8 G/DL (ref 6.4–8.2)

## 2024-04-17 PROCEDURE — 84165 PROTEIN E-PHORESIS SERUM: CPT | Performed by: STUDENT IN AN ORGANIZED HEALTH CARE EDUCATION/TRAINING PROGRAM

## 2024-05-20 DIAGNOSIS — E78.5 DYSLIPIDEMIA: ICD-10-CM

## 2024-05-20 RX ORDER — ATORVASTATIN CALCIUM 40 MG/1
40 TABLET, FILM COATED ORAL DAILY
Qty: 90 TABLET | Refills: 1 | Status: SHIPPED | OUTPATIENT
Start: 2024-05-20

## 2024-11-18 DIAGNOSIS — E78.5 DYSLIPIDEMIA: ICD-10-CM

## 2024-11-19 RX ORDER — ATORVASTATIN CALCIUM 40 MG/1
40 TABLET, FILM COATED ORAL DAILY
Qty: 90 TABLET | Refills: 1 | Status: SHIPPED | OUTPATIENT
Start: 2024-11-19

## 2024-12-04 ENCOUNTER — OFFICE VISIT (OUTPATIENT)
Dept: FAMILY MEDICINE CLINIC | Facility: CLINIC | Age: 69
End: 2024-12-04
Payer: MEDICARE

## 2024-12-04 ENCOUNTER — APPOINTMENT (OUTPATIENT)
Dept: LAB | Age: 69
End: 2024-12-04
Payer: MEDICARE

## 2024-12-04 VITALS
OXYGEN SATURATION: 98 % | BODY MASS INDEX: 25.25 KG/M2 | HEART RATE: 67 BPM | HEIGHT: 68 IN | WEIGHT: 166.6 LBS | TEMPERATURE: 97.6 F | SYSTOLIC BLOOD PRESSURE: 124 MMHG | DIASTOLIC BLOOD PRESSURE: 76 MMHG

## 2024-12-04 DIAGNOSIS — M51.370 DEGENERATION OF INTERVERTEBRAL DISC OF LUMBOSACRAL REGION WITH DISCOGENIC BACK PAIN: ICD-10-CM

## 2024-12-04 DIAGNOSIS — Z00.00 MEDICARE ANNUAL WELLNESS VISIT, SUBSEQUENT: ICD-10-CM

## 2024-12-04 DIAGNOSIS — E78.2 MIXED HYPERLIPIDEMIA: ICD-10-CM

## 2024-12-04 DIAGNOSIS — E55.9 VITAMIN D DEFICIENCY: ICD-10-CM

## 2024-12-04 DIAGNOSIS — B36.0 TINEA VERSICOLOR: Primary | ICD-10-CM

## 2024-12-04 LAB
ALBUMIN SERPL BCG-MCNC: 4.6 G/DL (ref 3.5–5)
ALP SERPL-CCNC: 63 U/L (ref 34–104)
ALT SERPL W P-5'-P-CCNC: 21 U/L (ref 7–52)
ANION GAP SERPL CALCULATED.3IONS-SCNC: 5 MMOL/L (ref 4–13)
AST SERPL W P-5'-P-CCNC: 20 U/L (ref 13–39)
BASOPHILS # BLD AUTO: 0.04 THOUSANDS/ÂΜL (ref 0–0.1)
BASOPHILS NFR BLD AUTO: 1 % (ref 0–1)
BILIRUB SERPL-MCNC: 0.81 MG/DL (ref 0.2–1)
BUN SERPL-MCNC: 16 MG/DL (ref 5–25)
CALCIUM SERPL-MCNC: 9.1 MG/DL (ref 8.4–10.2)
CHLORIDE SERPL-SCNC: 99 MMOL/L (ref 96–108)
CHOLEST SERPL-MCNC: 152 MG/DL (ref ?–200)
CO2 SERPL-SCNC: 31 MMOL/L (ref 21–32)
CREAT SERPL-MCNC: 1.02 MG/DL (ref 0.6–1.3)
EOSINOPHIL # BLD AUTO: 0.24 THOUSAND/ÂΜL (ref 0–0.61)
EOSINOPHIL NFR BLD AUTO: 4 % (ref 0–6)
ERYTHROCYTE [DISTWIDTH] IN BLOOD BY AUTOMATED COUNT: 13.2 % (ref 11.6–15.1)
GFR SERPL CREATININE-BSD FRML MDRD: 73 ML/MIN/1.73SQ M
GLUCOSE P FAST SERPL-MCNC: 91 MG/DL (ref 65–99)
HCT VFR BLD AUTO: 46.6 % (ref 36.5–49.3)
HDLC SERPL-MCNC: 65 MG/DL
HGB BLD-MCNC: 15.3 G/DL (ref 12–17)
IMM GRANULOCYTES # BLD AUTO: 0.01 THOUSAND/UL (ref 0–0.2)
IMM GRANULOCYTES NFR BLD AUTO: 0 % (ref 0–2)
LDLC SERPL CALC-MCNC: 75 MG/DL (ref 0–100)
LYMPHOCYTES # BLD AUTO: 2.46 THOUSANDS/ÂΜL (ref 0.6–4.47)
LYMPHOCYTES NFR BLD AUTO: 41 % (ref 14–44)
MCH RBC QN AUTO: 29.8 PG (ref 26.8–34.3)
MCHC RBC AUTO-ENTMCNC: 32.8 G/DL (ref 31.4–37.4)
MCV RBC AUTO: 91 FL (ref 82–98)
MONOCYTES # BLD AUTO: 0.5 THOUSAND/ÂΜL (ref 0.17–1.22)
MONOCYTES NFR BLD AUTO: 8 % (ref 4–12)
NEUTROPHILS # BLD AUTO: 2.74 THOUSANDS/ÂΜL (ref 1.85–7.62)
NEUTS SEG NFR BLD AUTO: 46 % (ref 43–75)
NONHDLC SERPL-MCNC: 87 MG/DL
NRBC BLD AUTO-RTO: 0 /100 WBCS
PLATELET # BLD AUTO: 272 THOUSANDS/UL (ref 149–390)
PMV BLD AUTO: 10.6 FL (ref 8.9–12.7)
POTASSIUM SERPL-SCNC: 4.4 MMOL/L (ref 3.5–5.3)
PROT SERPL-MCNC: 7.3 G/DL (ref 6.4–8.4)
RBC # BLD AUTO: 5.14 MILLION/UL (ref 3.88–5.62)
SODIUM SERPL-SCNC: 135 MMOL/L (ref 135–147)
TRIGL SERPL-MCNC: 60 MG/DL (ref ?–150)
WBC # BLD AUTO: 5.99 THOUSAND/UL (ref 4.31–10.16)

## 2024-12-04 PROCEDURE — 99214 OFFICE O/P EST MOD 30 MIN: CPT | Performed by: FAMILY MEDICINE

## 2024-12-04 PROCEDURE — 80053 COMPREHEN METABOLIC PANEL: CPT

## 2024-12-04 PROCEDURE — 85025 COMPLETE CBC W/AUTO DIFF WBC: CPT

## 2024-12-04 PROCEDURE — 80061 LIPID PANEL: CPT

## 2024-12-04 PROCEDURE — G0438 PPPS, INITIAL VISIT: HCPCS | Performed by: FAMILY MEDICINE

## 2024-12-04 PROCEDURE — 36415 COLL VENOUS BLD VENIPUNCTURE: CPT

## 2024-12-04 NOTE — PROGRESS NOTES
Name: Eagle Ho      : 1955      MRN: [id number]  Encounter Provider: Katina Yeh MD  Encounter Date: 2024   Encounter department: [facility]    Assessment & Plan  Tinea versicolor  If spots do not clear after cream, and area does not tan with sun exposure, consider oral itraconazole.        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 They screened negative with a PHQ-2 score of 0.    Falls Plan of Care: balance, strength, and gait training instructions were provided. Polyneuropathy on the RLE      Preventive health issues were discussed with patient, and age appropriate screening tests were ordered as noted in patient's After Visit Summary. Personalized health advice and appropriate referrals for health education or preventive services given if needed, as noted in patient's After Visit Summary.    History of Present Illness     Here for f/u. MRI reviewed. Bone spurs, foraminal stenosis, more L sided per imaging, but pt with R sided polyneuropathy. Has upcoming neuro appt next month. Also saw derm, was treated for tinea versicolor with topical cream x 2 weeks.        Patient Care Team:  Katina Yeh MD as PCP - General (Family Medicine)    Review of Systems   Constitutional:  Negative for fatigue and fever.   HENT:  Negative for congestion.    Eyes:  Negative for visual disturbance.   Respiratory:  Negative for chest tightness and shortness of breath.    Cardiovascular:  Negative for chest pain and palpitations.   Gastrointestinal:  Negative for abdominal pain.   Genitourinary:  Negative for difficulty urinating.   Musculoskeletal:  Negative for arthralgias.   Neurological:  Negative for headaches.   Hematological:  Does not  bruise/bleed easily.     Medical History Reviewed by provider this encounter:       Annual Wellness Visit Questionnaire   Eagle is here for his Subsequent Wellness visit. Last Medicare Wellness visit information reviewed, patient interviewed and updates made to the record today.      Health Risk Assessment:   Patient rates overall health as very good. Patient feels that their physical health rating is same. Patient is very satisfied with their life. Eyesight was rated as same. Hearing was rated as same. Patient feels that their emotional and mental health rating is same. Patients states they are never, rarely angry. Patient states they are never, rarely unusually tired/fatigued. Pain experienced in the last 7 days has been some. Patient's pain rating has been 1/10. Patient states that he has experienced no weight loss or gain in last 6 months.     Depression Screening:   PHQ-2 Score: 0      Fall Risk Screening:   In the past year, patient has experienced: history of falling in past year    Number of falls: 1  Injured during fall?: No    Feels unsteady when standing or walking?: No    Worried about falling?: No      Home Safety:  Patient does not have trouble with stairs inside or outside of their home. Patient has working smoke alarms and has working carbon monoxide detector. Home safety hazards include: none.     Nutrition:   Current diet is Regular.     Medications:   Patient is currently taking over-the-counter supplements. OTC medications include: see medication list. Patient is able to manage medications.     Activities of Daily Living (ADLs)/Instrumental Activities of Daily Living (IADLs):   Walk and transfer into and out of bed and chair?: Yes  Dress and groom yourself?: Yes    Bathe or shower yourself?: Yes    Feed yourself? Yes  Do your laundry/housekeeping?: Yes  Manage your money, pay your bills and track your expenses?: Yes  Make your own meals?: Yes    Do your own shopping?: Yes    Previous  Hospitalizations:   Any hospitalizations or ED visits within the last 12 months?: No      Advance Care Planning:   Living will: Yes    Durable POA for healthcare: Yes    Advanced directive: Yes      Cognitive Screening:   Provider or family/friend/caregiver concerned regarding cognition?: No    PREVENTIVE SCREENINGS      Cardiovascular Screening:    General: Screening Not Indicated and History Lipid Disorder      Diabetes Screening:     General: Screening Current      Colorectal Cancer Screening:     General: Screening Current      Prostate Cancer Screening:    General: Screening Current      Osteoporosis Screening:    General: Screening Not Indicated      Abdominal Aortic Aneurysm (AAA) Screening:    Risk factors include: age between 65-74 yo        General: Screening Not Indicated      Lung Cancer Screening:     General: Screening Not Indicated      Hepatitis C Screening:    General: Screening Not Indicated    Screening, Brief Intervention, and Referral to Treatment (SBIRT)    Screening  Typical number of drinks in a day: 3  Typical number of drinks in a week: 21  Interpretation: Low risk drinking behavior.    AUDIT-C Screenin) How often did you have a drink containing alcohol in the past year? 4 or more times a week  2) How many drinks did you have on a typical day when you were drinking in the past year? 3 to 4  3) How often did you have 6 or more drinks on one occasion in the past year? never    AUDIT-C Score: 4  Interpretation: Score 4-12 (male): POSITIVE screen for alcohol misuse    AUDIT Screenin) How often during the last year have you found that you were not able to stop drinking once you had started? 0 - never  5) How often during the last year have you failed to do what was normally expected from you because of drinking? 0 - never  6) How often during the last year have you needed a first drink in the morning to get yourself going after a heavy drinking session? 0 - never  7) How often during  "the last year have you had a feeling of guilt or remorse after drinking? 0 - never  8) How often during the last year have you been unable to remember what happened the night before because you had been drinking? 0 - never  9) Have you or someone else been injured as a result of your drinking? 0 - no  10) Has a relative or friend or a doctor or another health worker been concerned about your drinking or suggested you cut down? 0 - no    AUDIT Score: 4  Interpretation: Low risk alcohol consumption    Single Item Drug Screening:  How often have you used an illegal drug (including marijuana) or a prescription medication for non-medical reasons in the past year? never    Single Item Drug Screen Score: 0  Interpretation: Negative screen for possible drug use disorder    Social Drivers of Health     Financial Resource Strain: Patient Declined (9/21/2023)    Overall Financial Resource Strain (CARDIA)     Difficulty of Paying Living Expenses: Patient declined   Food Insecurity: Patient Declined (12/3/2024)    Hunger Vital Sign     Worried About Running Out of Food in the Last Year: Patient declined     Ran Out of Food in the Last Year: Patient declined   Transportation Needs: Patient Declined (12/3/2024)    PRAPARE - Transportation     Lack of Transportation (Medical): Patient declined     Lack of Transportation (Non-Medical): Patient declined   Housing Stability: Patient Declined (12/4/2024)    Housing Stability Vital Sign     Unable to Pay for Housing in the Last Year: Patient declined     Number of Times Moved in the Last Year: 0     Homeless in the Last Year: Patient declined   Utilities: Patient Declined (12/3/2024)    [facility] Utilities     Threatened with loss of utilities: Patient declined     No results found.    Objective   /76 (BP Location: Left arm, Patient Position: Sitting, Cuff Size: Large)   Pulse 67   Temp 97.6 °F (36.4 °C) (Temporal)   Ht 5' 7.75\" (1.721 m)   Wt 75.6 kg (166 lb 9.6 oz)   SpO2 98%   " BMI 25.52 kg/m²     Physical Exam  Vitals reviewed.   Constitutional:       Appearance: Normal appearance.   Cardiovascular:      Rate and Rhythm: Normal rate and regular rhythm.      Heart sounds: Normal heart sounds.   Pulmonary:      Effort: Pulmonary effort is normal.      Breath sounds: Normal breath sounds.   Musculoskeletal:      Right lower leg: No edema.      Left lower leg: No edema.   Neurological:      General: No focal deficit present.      Mental Status: He is alert and oriented to person, place, and time.   Psychiatric:         Mood and Affect: Mood normal.         Behavior: Behavior normal.         Thought Content: Thought content normal.         Judgment: Judgment normal.

## 2024-12-04 NOTE — PATIENT INSTRUCTIONS
Medicare Preventive Visit Patient Instructions  Thank you for completing your Welcome to Medicare Visit or Medicare Annual Wellness Visit today. Your next wellness visit will be due in one year (12/5/2025).  The screening/preventive services that you may require over the next 5-10 years are detailed below. Some tests may not apply to you based off risk factors and/or age. Screening tests ordered at today's visit but not completed yet may show as past due. Also, please note that scanned in results may not display below.  Preventive Screenings:  Service Recommendations Previous Testing/Comments   Colorectal Cancer Screening  Colonoscopy    Fecal Occult Blood Test (FOBT)/Fecal Immunochemical Test (FIT)  Fecal DNA/Cologuard Test  Flexible Sigmoidoscopy Age: 45-75 years old   Colonoscopy: every 10 years (May be performed more frequently if at higher risk)  OR  FOBT/FIT: every 1 year  OR  Cologuard: every 3 years  OR  Sigmoidoscopy: every 5 years  Screening may be recommended earlier than age 45 if at higher risk for colorectal cancer. Also, an individualized decision between you and your healthcare provider will decide whether screening between the ages of 76-85 would be appropriate. Colonoscopy: 11/01/2017  FOBT/FIT: Not on file  Cologuard: Not on file  Sigmoidoscopy: Not on file    Screening Current     Prostate Cancer Screening Individualized decision between patient and health care provider in men between ages of 55-69   Medicare will cover every 12 months beginning on the day after your 50th birthday PSA: 1.81 ng/mL     Screening Current     Hepatitis C Screening Once for adults born between 1945 and 1965  More frequently in patients at high risk for Hepatitis C Hep C Antibody: Not on file    Screening Not Indicated   Diabetes Screening 1-2 times per year if you're at risk for diabetes or have pre-diabetes Fasting glucose: 89 mg/dL (4/11/2024)  A1C: 5.5 % (4/16/2024)  Screening Current   Cholesterol Screening Once  every 5 years if you don't have a lipid disorder. May order more often based on risk factors. Lipid panel: 04/11/2024  Screening Not Indicated  History Lipid Disorder      Other Preventive Screenings Covered by Medicare:  Abdominal Aortic Aneurysm (AAA) Screening: covered once if your at risk. You're considered to be at risk if you have a family history of AAA or a male between the age of 65-75 who smoking at least 100 cigarettes in your lifetime.  Lung Cancer Screening: covers low dose CT scan once per year if you meet all of the following conditions: (1) Age 55-77; (2) No signs or symptoms of lung cancer; (3) Current smoker or have quit smoking within the last 15 years; (4) You have a tobacco smoking history of at least 20 pack years (packs per day x number of years you smoked); (5) You get a written order from a healthcare provider.  Glaucoma Screening: covered annually if you're considered high risk: (1) You have diabetes OR (2) Family history of glaucoma OR (3)  aged 50 and older OR (4)  American aged 65 and older  Osteoporosis Screening: covered every 2 years if you meet one of the following conditions: (1) Have a vertebral abnormality; (2) On glucocorticoid therapy for more than 3 months; (3) Have primary hyperparathyroidism; (4) On osteoporosis medications and need to assess response to drug therapy.  HIV Screening: covered annually if you're between the age of 15-65. Also covered annually if you are younger than 15 and older than 65 with risk factors for HIV infection. For pregnant patients, it is covered up to 3 times per pregnancy.    Immunizations:  Immunization Recommendations   Influenza Vaccine Annual influenza vaccination during flu season is recommended for all persons aged >= 6 months who do not have contraindications   Pneumococcal Vaccine   * Pneumococcal conjugate vaccine = PCV13 (Prevnar 13), PCV15 (Vaxneuvance), PCV20 (Prevnar 20)  * Pneumococcal polysaccharide vaccine  = PPSV23 (Pneumovax) Adults 19-63 yo with certain risk factors or if 65+ yo  If never received any pneumonia vaccine: recommend Prevnar 20 (PCV20)  Give PCV20 if previously received 1 dose of PCV13 or PPSV23   Hepatitis B Vaccine 3 dose series if at intermediate or high risk (ex: diabetes, end stage renal disease, liver disease)   Respiratory syncytial virus (RSV) Vaccine - COVERED BY MEDICARE PART D  * RSVPreF3 (Arexvy) CDC recommends that adults 60 years of age and older may receive a single dose of RSV vaccine using shared clinical decision-making (SCDM)   Tetanus (Td) Vaccine - COST NOT COVERED BY MEDICARE PART B Following completion of primary series, a booster dose should be given every 10 years to maintain immunity against tetanus. Td may also be given as tetanus wound prophylaxis.   Tdap Vaccine - COST NOT COVERED BY MEDICARE PART B Recommended at least once for all adults. For pregnant patients, recommended with each pregnancy.   Shingles Vaccine (Shingrix) - COST NOT COVERED BY MEDICARE PART B  2 shot series recommended in those 19 years and older who have or will have weakened immune systems or those 50 years and older     Health Maintenance Due:      Topic Date Due   • Hepatitis C Screening  Never done   • Colorectal Cancer Screening  11/01/2027     Immunizations Due:      Topic Date Due   • Pneumococcal Vaccine: 65+ Years (1 of 1 - PCV) Never done   • COVID-19 Vaccine (4 - 2024-25 season) 09/01/2024     Advance Directives   What are advance directives?  Advance directives are legal documents that state your wishes and plans for medical care. These plans are made ahead of time in case you lose your ability to make decisions for yourself. Advance directives can apply to any medical decision, such as the treatments you want, and if you want to donate organs.   What are the types of advance directives?  There are many types of advance directives, and each state has rules about how to use them. You may  choose a combination of any of the following:  Living will:  This is a written record of the treatment you want. You can also choose which treatments you do not want, which to limit, and which to stop at a certain time. This includes surgery, medicine, IV fluid, and tube feedings.   Durable power of  for healthcare (DPAHC):  This is a written record that states who you want to make healthcare choices for you when you are unable to make them for yourself. This person, called a proxy, is usually a family member or a friend. You may choose more than 1 proxy.  Do not resuscitate (DNR) order:  A DNR order is used in case your heart stops beating or you stop breathing. It is a request not to have certain forms of treatment, such as CPR. A DNR order may be included in other types of advance directives.  Medical directive:  This covers the care that you want if you are in a coma, near death, or unable to make decisions for yourself. You can list the treatments you want for each condition. Treatment may include pain medicine, surgery, blood transfusions, dialysis, IV or tube feedings, and a ventilator (breathing machine).  Values history:  This document has questions about your views, beliefs, and how you feel and think about life. This information can help others choose the care that you would choose.  Why are advance directives important?  An advance directive helps you control your care. Although spoken wishes may be used, it is better to have your wishes written down. Spoken wishes can be misunderstood, or not followed. Treatments may be given even if you do not want them. An advance directive may make it easier for your family to make difficult choices about your care.   Fall Prevention    Fall prevention  includes ways to make your home and other areas safer. It also includes ways you can move more carefully to prevent a fall. Health conditions that cause changes in your blood pressure, vision, or muscle  strength and coordination may increase your risk for falls. Medicines may also increase your risk for falls if they make you dizzy, weak, or sleepy.   Fall prevention tips:   Stand or sit up slowly.    Use assistive devices as directed.    Wear shoes that fit well and have soles that .    Wear a personal alarm.    Stay active.    Manage your medical conditions.    Home Safety Tips:  Add items to prevent falls in the bathroom.    Keep paths clear.    Install bright lights in your home.    Keep items you use often on shelves within reach.    Paint or place reflective tape on the edges of your stairs.    Weight Management   Why it is important to manage your weight:  Being overweight increases your risk of health conditions such as heart disease, high blood pressure, type 2 diabetes, and certain types of cancer. It can also increase your risk for osteoarthritis, sleep apnea, and other respiratory problems. Aim for a slow, steady weight loss. Even a small amount of weight loss can lower your risk of health problems.  How to lose weight safely:  A safe and healthy way to lose weight is to eat fewer calories and get regular exercise. You can lose up about 1 pound a week by decreasing the number of calories you eat by 500 calories each day.   Healthy meal plan for weight management:  A healthy meal plan includes a variety of foods, contains fewer calories, and helps you stay healthy. A healthy meal plan includes the following:  Eat whole-grain foods more often.  A healthy meal plan should contain fiber. Fiber is the part of grains, fruits, and vegetables that is not broken down by your body. Whole-grain foods are healthy and provide extra fiber in your diet. Some examples of whole-grain foods are whole-wheat breads and pastas, oatmeal, brown rice, and bulgur.  Eat a variety of vegetables every day.  Include dark, leafy greens such as spinach, kale, karina greens, and mustard greens. Eat yellow and orange vegetables  such as carrots, sweet potatoes, and winter squash.   Eat a variety of fruits every day.  Choose fresh or canned fruit (canned in its own juice or light syrup) instead of juice. Fruit juice has very little or no fiber.  Eat low-fat dairy foods.  Drink fat-free (skim) milk or 1% milk. Eat fat-free yogurt and low-fat cottage cheese. Try low-fat cheeses such as mozzarella and other reduced-fat cheeses.  Choose meat and other protein foods that are low in fat.  Choose beans or other legumes such as split peas or lentils. Choose fish, skinless poultry (chicken or turkey), or lean cuts of red meat (beef or pork). Before you cook meat or poultry, cut off any visible fat.   Use less fat and oil.  Try baking foods instead of frying them. Add less fat, such as margarine, sour cream, regular salad dressing and mayonnaise to foods. Eat fewer high-fat foods. Some examples of high-fat foods include french fries, doughnuts, ice cream, and cakes.  Eat fewer sweets.  Limit foods and drinks that are high in sugar. This includes candy, cookies, regular soda, and sweetened drinks.  Exercise:  Exercise at least 30 minutes per day on most days of the week. Some examples of exercise include walking, biking, dancing, and swimming. You can also fit in more physical activity by taking the stairs instead of the elevator or parking farther away from stores. Ask your healthcare provider about the best exercise plan for you.   Alcohol Use and Your Health    Drinking too much can harm your health.  Excessive alcohol use leads to about 88,000 death in the [place] each year, and shortens the life of those who diet by almost 30 years.  Further, excessive drinking cost the economy $249 billion in 2010.  Most excessive drinkers are not alcohol dependent.    Excessive alcohol use has immediate effects that increase the risk of many harmful health conditions.  These are most often the result of binge drinking.  Over time, excessive alcohol use can  "lead to the development of chronic diseases and other series health problems.    What is considered a \"drink\"?        Excessive alcohol use includes:  Binge Drinking: For women, 4 or more drinks consumed on one occasion. For men, 5 or more drinks consumed on one occasion.  Heavy Drinking: For women, 8 or more drinks per week. For men, 15 or more drinks per week  Any alcohol used by pregnant women  Any alcohol used by those under the age of 21 years    If you choose to drink, do so in moderation:  Do not drink at all if you are under the age of 21, or if you are or may be pregnant, or have health problems that could be made worse by drinking.  For women, up to 1 drink per day  For men, up to 2 drinks a day    No one should begin drinking or drink more frequently based on potential health benefits    Short-Term Health Risks:  Injuries: motor vehicle crashes, falls, drownings, burns  Violence: homicide, suicide, sexual assault, intimate partner violence  Alcohol poisoning  Reproductive health: risky sexual behaviors, unintended prengnacy, sexually transmitted diseases, miscarriage, stillbirth, fetal alcohol syndrome    Long-Term Health Risks:  Chronic diseases: high blood pressure, heart disease, stroke, liver disease, digestive problems  Cancers: breast, mouth and throat, liver, colon  Learning and memory problems: dementia, poor school performance  Mental health: depression, anxiety, insomnia  Social problems: lost productivity, family problems, unemployment  Alcohol dependence    For support and more information:  Substance Abuse and Mental Health Services Administration  [address]  [place] [address]  Web Address: http://www.[facility].gov    Alcoholics Anonymous        Web Address: http://www.aa.org    https://www.cdc.gov/alcohol/fact-sheets/alcohol-use.htm     © Copyright [employer] 2018 Information is for End User's use only and may not be sold, redistributed or otherwise used for commercial purposes. " All illustrations and images included in CareNotes® are the copyrighted property of [employer] or [employer]    Medicare Preventive Visit Patient Instructions  Thank you for completing your Welcome to Medicare Visit or Medicare Annual Wellness Visit today. Your next wellness visit will be due in one year (12/5/2025).  The screening/preventive services that you may require over the next 5-10 years are detailed below. Some tests may not apply to you based off risk factors and/or age. Screening tests ordered at today's visit but not completed yet may show as past due. Also, please note that scanned in results may not display below.  Preventive Screenings:  Service Recommendations Previous Testing/Comments   Colorectal Cancer Screening  Colonoscopy    Fecal Occult Blood Test (FOBT)/Fecal Immunochemical Test (FIT)  Fecal DNA/Cologuard Test  Flexible Sigmoidoscopy Age: 45-75 years old   Colonoscopy: every 10 years (May be performed more frequently if at higher risk)  OR  FOBT/FIT: every 1 year  OR  Cologuard: every 3 years  OR  Sigmoidoscopy: every 5 years  Screening may be recommended earlier than age 45 if at higher risk for colorectal cancer. Also, an individualized decision between you and your healthcare provider will decide whether screening between the ages of 76-85 would be appropriate. Colonoscopy: 11/01/2017  FOBT/FIT: Not on file  Cologuard: Not on file  Sigmoidoscopy: Not on file    Screening Current     Prostate Cancer Screening Individualized decision between patient and health care provider in men between ages of 55-69   Medicare will cover every 12 months beginning on the day after your 50th birthday PSA: 1.81 ng/mL     Screening Current     Hepatitis C Screening Once for adults born between 1945 and 1965  More frequently in patients at high risk for Hepatitis C Hep C Antibody: Not on file    Screening Not Indicated   Diabetes Screening 1-2 times per year if you're at risk for diabetes or have  pre-diabetes Fasting glucose: 89 mg/dL (4/11/2024)  A1C: 5.5 % (4/16/2024)  Screening Current   Cholesterol Screening Once every 5 years if you don't have a lipid disorder. May order more often based on risk factors. Lipid panel: 04/11/2024  Screening Not Indicated  History Lipid Disorder      Other Preventive Screenings Covered by Medicare:  Abdominal Aortic Aneurysm (AAA) Screening: covered once if your at risk. You're considered to be at risk if you have a family history of AAA or a male between the age of 65-75 who smoking at least 100 cigarettes in your lifetime.  Lung Cancer Screening: covers low dose CT scan once per year if you meet all of the following conditions: (1) Age 55-77; (2) No signs or symptoms of lung cancer; (3) Current smoker or have quit smoking within the last 15 years; (4) You have a tobacco smoking history of at least 20 pack years (packs per day x number of years you smoked); (5) You get a written order from a healthcare provider.  Glaucoma Screening: covered annually if you're considered high risk: (1) You have diabetes OR (2) Family history of glaucoma OR (3)  aged 50 and older OR (4)  American aged 65 and older  Osteoporosis Screening: covered every 2 years if you meet one of the following conditions: (1) Have a vertebral abnormality; (2) On glucocorticoid therapy for more than 3 months; (3) Have primary hyperparathyroidism; (4) On osteoporosis medications and need to assess response to drug therapy.  HIV Screening: covered annually if you're between the age of 15-65. Also covered annually if you are younger than 15 and older than 65 with risk factors for HIV infection. For pregnant patients, it is covered up to 3 times per pregnancy.    Immunizations:  Immunization Recommendations   Influenza Vaccine Annual influenza vaccination during flu season is recommended for all persons aged >= 6 months who do not have contraindications   Pneumococcal Vaccine   *  Pneumococcal conjugate vaccine = PCV13 (Prevnar 13), PCV15 (Vaxneuvance), PCV20 (Prevnar 20)  * Pneumococcal polysaccharide vaccine = PPSV23 (Pneumovax) Adults 19-63 yo with certain risk factors or if 65+ yo  If never received any pneumonia vaccine: recommend Prevnar 20 (PCV20)  Give PCV20 if previously received 1 dose of PCV13 or PPSV23   Hepatitis B Vaccine 3 dose series if at intermediate or high risk (ex: diabetes, end stage renal disease, liver disease)   Respiratory syncytial virus (RSV) Vaccine - COVERED BY MEDICARE PART D  * RSVPreF3 (Arexvy) CDC recommends that adults 60 years of age and older may receive a single dose of RSV vaccine using shared clinical decision-making (SCDM)   Tetanus (Td) Vaccine - COST NOT COVERED BY MEDICARE PART B Following completion of primary series, a booster dose should be given every 10 years to maintain immunity against tetanus. Td may also be given as tetanus wound prophylaxis.   Tdap Vaccine - COST NOT COVERED BY MEDICARE PART B Recommended at least once for all adults. For pregnant patients, recommended with each pregnancy.   Shingles Vaccine (Shingrix) - COST NOT COVERED BY MEDICARE PART B  2 shot series recommended in those 19 years and older who have or will have weakened immune systems or those 50 years and older     Health Maintenance Due:      Topic Date Due   • Hepatitis C Screening  Never done   • Colorectal Cancer Screening  11/01/2027     Immunizations Due:      Topic Date Due   • Pneumococcal Vaccine: 65+ Years (1 of 1 - PCV) Never done   • COVID-19 Vaccine (4 - 2024-25 season) 09/01/2024     Advance Directives   What are advance directives?  Advance directives are legal documents that state your wishes and plans for medical care. These plans are made ahead of time in case you lose your ability to make decisions for yourself. Advance directives can apply to any medical decision, such as the treatments you want, and if you want to donate organs.   What are the  types of advance directives?  There are many types of advance directives, and each state has rules about how to use them. You may choose a combination of any of the following:  Living will:  This is a written record of the treatment you want. You can also choose which treatments you do not want, which to limit, and which to stop at a certain time. This includes surgery, medicine, IV fluid, and tube feedings.   Durable power of  for healthcare (DPAHC):  This is a written record that states who you want to make healthcare choices for you when you are unable to make them for yourself. This person, called a proxy, is usually a family member or a friend. You may choose more than 1 proxy.  Do not resuscitate (DNR) order:  A DNR order is used in case your heart stops beating or you stop breathing. It is a request not to have certain forms of treatment, such as CPR. A DNR order may be included in other types of advance directives.  Medical directive:  This covers the care that you want if you are in a coma, near death, or unable to make decisions for yourself. You can list the treatments you want for each condition. Treatment may include pain medicine, surgery, blood transfusions, dialysis, IV or tube feedings, and a ventilator (breathing machine).  Values history:  This document has questions about your views, beliefs, and how you feel and think about life. This information can help others choose the care that you would choose.  Why are advance directives important?  An advance directive helps you control your care. Although spoken wishes may be used, it is better to have your wishes written down. Spoken wishes can be misunderstood, or not followed. Treatments may be given even if you do not want them. An advance directive may make it easier for your family to make difficult choices about your care.   Fall Prevention    Fall prevention  includes ways to make your home and other areas safer. It also includes ways  you can move more carefully to prevent a fall. Health conditions that cause changes in your blood pressure, vision, or muscle strength and coordination may increase your risk for falls. Medicines may also increase your risk for falls if they make you dizzy, weak, or sleepy.   Fall prevention tips:   Stand or sit up slowly.    Use assistive devices as directed.    Wear shoes that fit well and have soles that .    Wear a personal alarm.    Stay active.    Manage your medical conditions.    Home Safety Tips:  Add items to prevent falls in the bathroom.    Keep paths clear.    Install bright lights in your home.    Keep items you use often on shelves within reach.    Paint or place reflective tape on the edges of your stairs.    Weight Management   Why it is important to manage your weight:  Being overweight increases your risk of health conditions such as heart disease, high blood pressure, type 2 diabetes, and certain types of cancer. It can also increase your risk for osteoarthritis, sleep apnea, and other respiratory problems. Aim for a slow, steady weight loss. Even a small amount of weight loss can lower your risk of health problems.  How to lose weight safely:  A safe and healthy way to lose weight is to eat fewer calories and get regular exercise. You can lose up about 1 pound a week by decreasing the number of calories you eat by 500 calories each day.   Healthy meal plan for weight management:  A healthy meal plan includes a variety of foods, contains fewer calories, and helps you stay healthy. A healthy meal plan includes the following:  Eat whole-grain foods more often.  A healthy meal plan should contain fiber. Fiber is the part of grains, fruits, and vegetables that is not broken down by your body. Whole-grain foods are healthy and provide extra fiber in your diet. Some examples of whole-grain foods are whole-wheat breads and pastas, oatmeal, brown rice, and bulgur.  Eat a variety of vegetables every  day.  Include dark, leafy greens such as spinach, kale, karina greens, and mustard greens. Eat yellow and orange vegetables such as carrots, sweet potatoes, and winter squash.   Eat a variety of fruits every day.  Choose fresh or canned fruit (canned in its own juice or light syrup) instead of juice. Fruit juice has very little or no fiber.  Eat low-fat dairy foods.  Drink fat-free (skim) milk or 1% milk. Eat fat-free yogurt and low-fat cottage cheese. Try low-fat cheeses such as mozzarella and other reduced-fat cheeses.  Choose meat and other protein foods that are low in fat.  Choose beans or other legumes such as split peas or lentils. Choose fish, skinless poultry (chicken or turkey), or lean cuts of red meat (beef or pork). Before you cook meat or poultry, cut off any visible fat.   Use less fat and oil.  Try baking foods instead of frying them. Add less fat, such as margarine, sour cream, regular salad dressing and mayonnaise to foods. Eat fewer high-fat foods. Some examples of high-fat foods include french fries, doughnuts, ice cream, and cakes.  Eat fewer sweets.  Limit foods and drinks that are high in sugar. This includes candy, cookies, regular soda, and sweetened drinks.  Exercise:  Exercise at least 30 minutes per day on most days of the week. Some examples of exercise include walking, biking, dancing, and swimming. You can also fit in more physical activity by taking the stairs instead of the elevator or parking farther away from stores. Ask your healthcare provider about the best exercise plan for you.   Alcohol Use and Your Health    Drinking too much can harm your health.  Excessive alcohol use leads to about 88,000 death in the [place] each year, and shortens the life of those who diet by almost 30 years.  Further, excessive drinking cost the economy $249 billion in 2010.  Most excessive drinkers are not alcohol dependent.    Excessive alcohol use has immediate effects that increase the risk  "of many harmful health conditions.  These are most often the result of binge drinking.  Over time, excessive alcohol use can lead to the development of chronic diseases and other series health problems.    What is considered a \"drink\"?        Excessive alcohol use includes:  Binge Drinking: For women, 4 or more drinks consumed on one occasion. For men, 5 or more drinks consumed on one occasion.  Heavy Drinking: For women, 8 or more drinks per week. For men, 15 or more drinks per week  Any alcohol used by pregnant women  Any alcohol used by those under the age of 21 years    If you choose to drink, do so in moderation:  Do not drink at all if you are under the age of 21, or if you are or may be pregnant, or have health problems that could be made worse by drinking.  For women, up to 1 drink per day  For men, up to 2 drinks a day    No one should begin drinking or drink more frequently based on potential health benefits    Short-Term Health Risks:  Injuries: motor vehicle crashes, falls, drownings, burns  Violence: homicide, suicide, sexual assault, intimate partner violence  Alcohol poisoning  Reproductive health: risky sexual behaviors, unintended prengnacy, sexually transmitted diseases, miscarriage, stillbirth, fetal alcohol syndrome    Long-Term Health Risks:  Chronic diseases: high blood pressure, heart disease, stroke, liver disease, digestive problems  Cancers: breast, mouth and throat, liver, colon  Learning and memory problems: dementia, poor school performance  Mental health: depression, anxiety, insomnia  Social problems: lost productivity, family problems, unemployment  Alcohol dependence    For support and more information:  Substance Abuse and Mental Health Services Administration  [address]  [place] [address]  Web Address: http://www.samhsa.gov    Alcoholics Anonymous        Web Address: http://www.aa.org    https://www.cdc.gov/alcohol/fact-sheets/alcohol-use.htm     © Copyright [employer] " [employer] 2018 Information is for End User's use only and may not be sold, redistributed or otherwise used for commercial purposes. All illustrations and images included in CareNotes® are the copyrighted property of A.D.A.M., Inc. or [employer]

## 2024-12-04 NOTE — PROGRESS NOTES
Name: Eagle Ho      : 1955      MRN: [id number]  Encounter Provider: Katina Yeh MD  Encounter Date: 2024   Encounter department: [facility]    Assessment & Plan  Tinea versicolor  Call if need oral med, if rash doesn't clear with topical cream       Mixed hyperlipidemia  Controlled with statin and coQ10  Orders:    CBC and differential; Future    Comprehensive metabolic panel; Future    Lipid panel; Future    Vitamin D deficiency         Degeneration of intervertebral disc of lumbosacral region with discogenic back pain         Medicare annual wellness visit, subsequent              Preventive health issues were discussed with patient, and age appropriate screening tests were ordered as noted in patient's After Visit Summary. Personalized health advice and appropriate referrals for health education or preventive services given if needed, as noted in patient's After Visit Summary.    History of Present Illness     HPI   Patient Care Team:  Katina Yeh MD as PCP - General (Family Medicine)    Review of Systems  Medical History Reviewed by provider this encounter:       Annual Wellness Visit Questionnaire       Health Risk Assessment:   Patient rates overall health as very good. Patient feels that their physical health rating is same. Patient is very satisfied with their life. Eyesight was rated as same. Hearing was rated as same. Patient feels that their emotional and mental health rating is same. Patients states they are never, rarely angry. Patient states they are never, rarely unusually tired/fatigued. Pain experienced in the last 7 days has been some. Patient's pain rating has been 1/10. Patient states that he has experienced no weight loss or gain in last 6 months.     Depression Screening:   PHQ-2 Score: 0      Fall Risk Screening:   In the past year, patient has experienced: history of falling in past year      Home Safety:  Patient does not have trouble with  stairs inside or outside of their home. Patient has working smoke alarms and has working carbon monoxide detector. Home safety hazards include: none.     Nutrition:   Current diet is Regular.     Medications:   Patient is currently taking over-the-counter supplements. OTC medications include: see medication list. Patient is able to manage medications.     Activities of Daily Living (ADLs)/Instrumental Activities of Daily Living (IADLs):   Walk and transfer into and out of bed and chair?: Yes  Dress and groom yourself?: Yes    Bathe or shower yourself?: Yes    Feed yourself? Yes  Do your laundry/housekeeping?: Yes  Manage your money, pay your bills and track your expenses?: Yes  Make your own meals?: Yes    Do your own shopping?: Yes    Previous Hospitalizations:   Any hospitalizations or ED visits within the last 12 months?: No      Advance Care Planning:   Living will: Yes    Durable POA for healthcare: Yes    Advanced directive: Yes      Screening, Brief Intervention, and Referral to Treatment (SBIRT)    Screening  Typical number of drinks in a day: 3  Typical number of drinks in a week: 21  Interpretation: Low risk drinking behavior.    AUDIT-C Screenin) How often did you have a drink containing alcohol in the past year? 4 or more times a week  2) How many drinks did you have on a typical day when you were drinking in the past year? 3 to 4  3) How often did you have 6 or more drinks on one occasion in the past year? never    AUDIT-C Score: 4  Interpretation: Score 4-12 (male): POSITIVE screen for alcohol misuse    AUDIT Screenin) How often during the last year have you found that you were not able to stop drinking once you had started? 0 - never  5) How often during the last year have you failed to do what was normally expected from you because of drinking? 0 - never  6) How often during the last year have you needed a first drink in the morning to get yourself going after a heavy drinking session? 0 -  "never  7) How often during the last year have you had a feeling of guilt or remorse after drinking? 0 - never  8) How often during the last year have you been unable to remember what happened the night before because you had been drinking? 0 - never  9) Have you or someone else been injured as a result of your drinking? 0 - no  10) Has a relative or friend or a doctor or another health worker been concerned about your drinking or suggested you cut down? 0 - no    AUDIT Score: 4  Interpretation: Low risk alcohol consumption    Single Item Drug Screening:  How often have you used an illegal drug (including marijuana) or a prescription medication for non-medical reasons in the past year? never    Single Item Drug Screen Score: 0  Interpretation: Negative screen for possible drug use disorder    Social Drivers of Health     Financial Resource Strain: Patient Declined (9/21/2023)    Overall Financial Resource Strain (CARDIA)     Difficulty of Paying Living Expenses: Patient declined   Food Insecurity: Patient Declined (12/3/2024)    Hunger Vital Sign     Worried About Running Out of Food in the Last Year: Patient declined     Ran Out of Food in the Last Year: Patient declined   Transportation Needs: Patient Declined (12/3/2024)    PRAPARE - Transportation     Lack of Transportation (Medical): Patient declined     Lack of Transportation (Non-Medical): Patient declined   Housing Stability: Patient Declined (12/4/2024)    Housing Stability Vital Sign     Unable to Pay for Housing in the Last Year: Patient declined     Number of Times Moved in the Last Year: 0     Homeless in the Last Year: Patient declined   Utilities: Patient Declined (12/3/2024)    [facility] Utilities     Threatened with loss of utilities: Patient declined     No results found.    Objective   /76 (BP Location: Left arm, Patient Position: Sitting, Cuff Size: Large)   Pulse 67   Temp 97.6 °F (36.4 °C) (Temporal)   Ht 5' 7.75\" (1.721 m)   Wt 75.6 kg (166 " lb 9.6 oz)   SpO2 98%   BMI 25.52 kg/m²     Physical Exam  Vitals reviewed.   Constitutional:       Appearance: Normal appearance.   Cardiovascular:      Rate and Rhythm: Normal rate and regular rhythm.      Heart sounds: Normal heart sounds.   Pulmonary:      Effort: Pulmonary effort is normal.      Breath sounds: Normal breath sounds.   Musculoskeletal:      Right lower leg: No edema.      Left lower leg: No edema.   Neurological:      General: No focal deficit present.      Mental Status: He is alert and oriented to person, place, and time.   Psychiatric:         Mood and Affect: Mood normal.         Behavior: Behavior normal.         Thought Content: Thought content normal.         Judgment: Judgment normal.

## 2024-12-04 NOTE — ASSESSMENT & PLAN NOTE
Controlled with statin and coQ10  Orders:    CBC and differential; Future    Comprehensive metabolic panel; Future    Lipid panel; Future

## 2024-12-05 ENCOUNTER — RESULTS FOLLOW-UP (OUTPATIENT)
Dept: FAMILY MEDICINE CLINIC | Facility: CLINIC | Age: 69
End: 2024-12-05

## 2024-12-24 ENCOUNTER — TELEPHONE (OUTPATIENT)
Dept: NEUROLOGY | Facility: CLINIC | Age: 69
End: 2024-12-24

## 2025-01-27 NOTE — PROGRESS NOTES
Name: Eagle Ho      : 1955      MRN: [id number]  Encounter Provider: Brenda Wade MD  Encounter Date: 2025   Encounter department: [facility]  :  Assessment & Plan  Radiculopathy, lumbar region    Orders:  •  Vitamin B1, whole blood; Future  •  Vitamin B6; Future    Hereditary and idiopathic neuropathy, unspecified    Orders:  •  Vitamin B6; Future      {Ambulatory Patient Instructions (Optional):47084}    History of Present Illness {?Quick Links Encounters * My Last Note * Last Note in Specialty * Snapshot * Since Last Visit * History :67936}  Mr Ho is a 69-year-old gentleman who is here for new neuromuscular evaluation of symptoms of neuropathy.  He is a self-referral.    PMH: Arthritis, TIA/stroke  PSH: Tooth extraction  Family: Cancer, heart disease        Review of Systems I have personally reviewed the MA's review of systems and made changes as necessary.    {Select to insert medical history sections (Optional):89464}     Objective {?Quick Links Trend Vitals * Enter New Vitals * Results Review * Timeline (Adult) * Labs * Imaging * Cardiology * Procedures * Lung Cancer Screening * Surgical eConsent :82432}  /74 (BP Location: Right arm, Patient Position: Sitting, Cuff Size: Adult)   Pulse 64   Temp 97.5 °F (36.4 °C) (Temporal)   Wt 74.7 kg (164 lb 11.2 oz)   SpO2 98%   BMI 25.23 kg/m²     Physical Exam  Neurological Exam          {Radiology Results Review (Optional):42853}    {Administrative / Billing Section (Optional):25835}

## 2025-01-28 ENCOUNTER — OFFICE VISIT (OUTPATIENT)
Dept: NEUROLOGY | Facility: CLINIC | Age: 70
End: 2025-01-28
Payer: MEDICARE

## 2025-01-28 VITALS
DIASTOLIC BLOOD PRESSURE: 74 MMHG | OXYGEN SATURATION: 98 % | TEMPERATURE: 97.5 F | WEIGHT: 164.7 LBS | HEART RATE: 64 BPM | BODY MASS INDEX: 25.23 KG/M2 | SYSTOLIC BLOOD PRESSURE: 114 MMHG

## 2025-01-28 DIAGNOSIS — G60.9 HEREDITARY AND IDIOPATHIC NEUROPATHY, UNSPECIFIED: ICD-10-CM

## 2025-01-28 DIAGNOSIS — M54.16 RADICULOPATHY, LUMBAR REGION: Primary | ICD-10-CM

## 2025-01-28 PROCEDURE — 99204 OFFICE O/P NEW MOD 45 MIN: CPT | Performed by: PSYCHIATRY & NEUROLOGY

## 2025-01-28 NOTE — PATIENT INSTRUCTIONS
Begin taking B12 1000 MCG daily  B1 & B6 levels at next lab blood draw  Reduce wine intake slowly perhaps to 2 glasses daily  Return on an as needed basis

## 2025-01-28 NOTE — PROGRESS NOTES
Name: Eagle Ho      : 1955      MRN: [id number]  Encounter Provider: Brenda Wade MD  Encounter Date: 2025   Encounter department: [facility]UROLOG[facility]    Assessment & Plan  Radiculopathy, lumbar region  Patient presents for neurology consult to address multiple years of ongoing right lower lateral leg and top of foot numbness and tingling without pain of acute onset following sudden lumbar back pain, the latter symptom of which is completely resolved with the prior symptom of which has persisted without significant change in severity for better or for worse.  Patient's workup has already been largely performed which includes largely unremarkable labs outside of a B12 level of approximately 190, history of alcohol use including 3 to 4 glasses of wine on most days, MRI showing severe foraminal narrowing to various locations of the lumbar spine, and EMG significant for likely L5 radiculopathy findings; all the above-mentioned findings were reviewed with patient in today's appointment.  Minimal remaining workup for patient includes obtaining vitamin B1 and B6 levels at next lab draw, but these would be possible additional factors relating to neuropathy and would not replace the above-mentioned findings revealing lumbar spine etiology.  Patient is already had PT with mild at best improvement but no worsening, patient instructed to continue using PT exercises at home as had been taught in therapy of months to years prior.  Patient instructed to begin taking 1000 mcg of B12 daily in addition to other supplements patient reports adherence to taking daily.  Weight intake was recommended to be reduced to only 2 glasses daily with consideration to further reduction as able.  Patient may return to neurology on an as-needed basis for new or worsening symptoms.  Patient may reach out for any acute concerns to the neuro clinic without having established appointment.  If abnormal B1 and  B6 levels were to be found, patient will be called and informed of any changes recommended.  Orders:    Vitamin B1, whole blood; Future    Vitamin B6; Future    Hereditary and idiopathic neuropathy, unspecified  Noted, see plan above  Orders:    Vitamin B6; Future      Patient Instructions   Begin taking B12 1000 MCG daily  B1 & B6 levels at next lab blood draw  Reduce wine intake slowly perhaps to 2 glasses daily  Return on an as needed basis     History of Present Illness   Patient presents as self-referral for neurology consult related to 3-year history of neuropathy.  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  A few weeks after the sudden onset back pain, which was not treated with any medications or topicals only with previous patient bought from a store and without any doctor evaluation, patient noticed that he had right lower leg numbness described as being on the right lateral leg to top of foot that had not been noticed to be of acute onset and possibly since the above-mentioned event but patient reports being distracted by lower back pain.  Although back pain is completely resolved, patient's numbness and tingling, which is without pain, persist to date without any notable improvement or worsening since first identified.  Patient notes that approximately a year after the event and MRI of the lumbar spine was obtained with results showing significant foraminal narrowing to many levels and bilaterally but asymmetrically, most pertinent would include the fact that there is severe foraminal to narrowing to the right on at least L4-S1.  Additionally, per chart review patient had EMG which revealed many abnormal findings but most pertinent includes no evidence for peroneal nerve entrapment but significant evidence  for spine related radiculopathy of L5 nerve. Walking on sands seems to be no issue, possibly improves symptoms.      Review of Systems   Constitutional:  Negative for activity change, appetite change, fatigue and fever.   HENT: Negative.  Negative for hearing loss, tinnitus, trouble swallowing and voice change.    Eyes: Negative.  Negative for photophobia, pain and visual disturbance.   Respiratory: Negative.  Negative for cough and shortness of breath.    Cardiovascular: Negative.  Negative for chest pain, palpitations and leg swelling.   Gastrointestinal: Negative.  Negative for nausea and vomiting.   Endocrine: Negative.  Negative for cold intolerance and heat intolerance.   Genitourinary: Negative.  Negative for dysuria, frequency and urgency.   Musculoskeletal:  Positive for gait problem (R leg can give out). Negative for arthralgias, back pain, joint swelling, myalgias, neck pain and neck stiffness.   Skin: Negative.  Negative for color change and rash.   Allergic/Immunologic: Negative.  Negative for environmental allergies, food allergies and immunocompromised state.   Neurological:  Positive for weakness (R leg) and numbness (R LE n/t x past 3 yrs). Negative for dizziness, tremors, seizures, syncope, facial asymmetry, speech difficulty, light-headedness and headaches.   Hematological: Negative.  Negative for adenopathy. Does not bruise/bleed easily.   Psychiatric/Behavioral: Negative.  Negative for confusion, hallucinations and sleep disturbance.    I have personally reviewed the MA's review of systems and made changes as necessary.    Current Outpatient Medications on File Prior to Visit   Medication Sig Dispense Refill    ASPIRIN 81 PO Take 1 tablet by mouth daily      atorvastatin (LIPITOR) 40 mg tablet TAKE 1 TABLET BY MOUTH EVERY DAY 90 tablet 1    Cholecalciferol (Vitamin D3) 50 MCG (2000 UT) TABS Take 4,000 Units by mouth daily      Coenzyme Q10 (CO Q 10 PO) Take by mouth      FIBER ADULT GUMMIES PO  Take by mouth 4-5 g daily      glucosamine-chondroitin 500-400 MG tablet Take 1 tablet by mouth 3 (three) times a day  0    Multiple Vitamin (MULTIVITAMIN) tablet Take 1 tablet by mouth daily      Omega-3 Fatty Acids (FISH OIL) 1000 MG CPDR Take by mouth 2 (two) times a day  0    TURMERIC PO Take by mouth       No current facility-administered medications on file prior to visit.      Social History     Tobacco Use    Smoking status: Never    Smokeless tobacco: Never   Vaping Use    Vaping status: Never Used   Substance and Sexual Activity    Alcohol use: Yes     Alcohol/week: 30.0 standard drinks of alcohol     Types: 20 Glasses of wine, 10 Cans of beer per week     Comment: daily wine    Drug use: Not Currently    Sexual activity: Yes     Partners: Female     Birth control/protection: None        Objective   /74 (BP Location: Right arm, Patient Position: Sitting, Cuff Size: Adult)   Pulse 64   Temp 97.5 °F (36.4 °C) (Temporal)   Wt 74.7 kg (164 lb 11.2 oz)   SpO2 98%   BMI 25.23 kg/m²     Physical Exam  Vitals and nursing note reviewed.   Constitutional:       General: He is not in acute distress.     Appearance: He is not ill-appearing, toxic-appearing or diaphoretic.   HENT:      Head: Normocephalic and atraumatic.      Right Ear: External ear normal.      Left Ear: External ear normal.      Nose: Nose normal.      Mouth/Throat:      Pharynx: Oropharynx is clear.   Eyes:      General: Lids are normal. No scleral icterus.        Right eye: No discharge.         Left eye: No discharge.      Extraocular Movements: Extraocular movements intact.      Conjunctiva/sclera: Conjunctivae normal.      Pupils: Pupils are equal, round, and reactive to light.   Neurological:      Cranial Nerves: No cranial nerve deficit.      Sensory: Sensory deficit present.      Motor: Weakness present.      Coordination: Coordination is intact. Coordination normal.      Gait: Gait normal.      Deep Tendon Reflexes: Reflexes  normal.   Psychiatric:         Speech: Speech normal.       Neurological Exam  Mental Status   Oriented to person, place, time and situation. Speech is normal. Language is fluent with no aphasia.    Cranial Nerves  CN I: Sense of smell is normal.  CN II: Visual acuity is normal. Visual fields full to confrontation.  CN III, IV, VI: Extraocular movements intact bilaterally. Normal lids and orbits bilaterally. Pupils equal round and reactive to light bilaterally.  CN V: Facial sensation is normal.  CN VII: Full and symmetric facial movement.  CN VIII: Hearing is normal.  CN IX, X: Palate elevates symmetrically. Normal gag reflex.  CN XI: Shoulder shrug strength is normal.  CN XII: Tongue midline without atrophy or fasciculations.    Motor  Normal muscle bulk throughout. No fasciculations present. Normal muscle tone. No abnormal involuntary movements. No pronator drift.                                             Right                     Left  Dorsiflexion                            5-                           Toe extension                        5-                             Sensory  Light touch abnormality: Pinprick abnormality: Temperature abnormality: Vibration abnormality: Proprioception abnormality:   Right: Loss of sensation in the L5 dermatome.  Some additional vibration loss to both w/ 7 & 9 seconds to L & R respectively.    Reflexes  Deep tendon reflexes are 2+ and symmetric except as noted.  Symmetrically hyporeflexic.    Coordination    Finger-to-nose, rapid alternating movements and heel-to-shin normal bilaterally without dysmetria.    Gait   Normal gait.Casual gait is normal including stance, stride, and arm swing.      Radiology Results Review: I personally reviewed the following image studies in PACS and associated radiology reports: MRI spine and procedure reports. My interpretation of the radiology images/reports is: As noted above.    Administrative Statements   I have spent a total time of 60  minutes in caring for this patient on the day of the visit/encounter including Diagnostic results, Prognosis, Risks and benefits of tx options, Patient and family education, Impressions, Counseling / Coordination of care, Documenting in the medical record, Reviewing / ordering tests, medicine, procedures  , Obtaining or reviewing history  , and Communicating with other healthcare professionals .

## 2025-01-28 NOTE — ASSESSMENT & PLAN NOTE
Patient presents for neurology consult to address multiple years of ongoing right lower lateral leg and top of foot numbness and tingling without pain of acute onset following sudden lumbar back pain, the latter symptom of which is completely resolved with the prior symptom of which has persisted without significant change in severity for better or for worse.  Patient's workup has already been largely performed which includes largely unremarkable labs outside of a B12 level of approximately 190, history of alcohol use including 3 to 4 glasses of wine on most days, MRI showing severe foraminal narrowing to various locations of the lumbar spine, and EMG significant for likely L5 radiculopathy findings; all the above-mentioned findings were reviewed with patient in today's appointment.  Minimal remaining workup for patient includes obtaining vitamin B1 and B6 levels at next lab draw, but these would be possible additional factors relating to neuropathy and would not replace the above-mentioned findings revealing lumbar spine etiology.  Patient is already had PT with mild at best improvement but no worsening, patient instructed to continue using PT exercises at home as had been taught in therapy of months to years prior.  Patient instructed to begin taking 1000 mcg of B12 daily in addition to other supplements patient reports adherence to taking daily.  Weight intake was recommended to be reduced to only 2 glasses daily with consideration to further reduction as able.  Patient may return to neurology on an as-needed basis for new or worsening symptoms.  Patient may reach out for any acute concerns to the neuro clinic without having established appointment.  If abnormal B1 and B6 levels were to be found, patient will be called and informed of any changes recommended.  Orders:    Vitamin B1, whole blood; Future    Vitamin B6; Future

## 2025-07-21 DIAGNOSIS — E78.5 DYSLIPIDEMIA: ICD-10-CM

## 2025-07-21 RX ORDER — ATORVASTATIN CALCIUM 40 MG/1
40 TABLET, FILM COATED ORAL DAILY
Qty: 90 TABLET | Refills: 0 | Status: SHIPPED | OUTPATIENT
Start: 2025-07-21